# Patient Record
Sex: MALE | Race: WHITE | NOT HISPANIC OR LATINO | Employment: FULL TIME | URBAN - METROPOLITAN AREA
[De-identification: names, ages, dates, MRNs, and addresses within clinical notes are randomized per-mention and may not be internally consistent; named-entity substitution may affect disease eponyms.]

---

## 2021-01-01 LAB — RETINOPATHY: NORMAL

## 2021-08-19 ENCOUNTER — TRANSFERRED RECORDS (OUTPATIENT)
Dept: HEALTH INFORMATION MANAGEMENT | Facility: CLINIC | Age: 36
End: 2021-08-19

## 2021-08-19 LAB
ALT SERPL-CCNC: 28 U/L (ref 9–46)
CREATININE (EXTERNAL): 1.06 MG/DL (ref 0.6–1.35)
GFR ESTIMATED (EXTERNAL): 90 ML/MIN/1.73M2
GFR ESTIMATED (IF AFRICAN AMERICAN) (EXTERNAL): 105 ML/MIN/1.73M2
GLUCOSE (EXTERNAL): 176 MG/DL (ref 65–99)
HBA1C MFR BLD: 11 %
POTASSIUM (EXTERNAL): 4.1 MMOL/L (ref 3.5–5.3)

## 2021-08-23 ENCOUNTER — MEDICAL CORRESPONDENCE (OUTPATIENT)
Dept: HEALTH INFORMATION MANAGEMENT | Facility: CLINIC | Age: 36
End: 2021-08-23

## 2022-01-21 ENCOUNTER — TRANSFERRED RECORDS (OUTPATIENT)
Dept: HEALTH INFORMATION MANAGEMENT | Facility: CLINIC | Age: 37
End: 2022-01-21

## 2022-01-21 ENCOUNTER — MEDICAL CORRESPONDENCE (OUTPATIENT)
Dept: HEALTH INFORMATION MANAGEMENT | Facility: CLINIC | Age: 37
End: 2022-01-21

## 2022-02-04 ENCOUNTER — TELEPHONE (OUTPATIENT)
Dept: ENDOCRINOLOGY | Facility: CLINIC | Age: 37
End: 2022-02-04

## 2022-03-06 ENCOUNTER — HEALTH MAINTENANCE LETTER (OUTPATIENT)
Age: 37
End: 2022-03-06

## 2022-05-11 ENCOUNTER — TELEPHONE (OUTPATIENT)
Dept: ENDOCRINOLOGY | Facility: CLINIC | Age: 37
End: 2022-05-11

## 2022-05-11 ENCOUNTER — OFFICE VISIT (OUTPATIENT)
Dept: ENDOCRINOLOGY | Facility: CLINIC | Age: 37
End: 2022-05-11
Payer: COMMERCIAL

## 2022-05-11 VITALS — HEART RATE: 91 BPM | WEIGHT: 176.2 LBS | SYSTOLIC BLOOD PRESSURE: 138 MMHG | DIASTOLIC BLOOD PRESSURE: 95 MMHG

## 2022-05-11 DIAGNOSIS — E78.1 HYPERTRIGLYCERIDEMIA: ICD-10-CM

## 2022-05-11 DIAGNOSIS — E11.65 UNCONTROLLED TYPE 2 DIABETES MELLITUS WITH HYPERGLYCEMIA (H): Primary | ICD-10-CM

## 2022-05-11 PROCEDURE — 99205 OFFICE O/P NEW HI 60 MIN: CPT | Performed by: INTERNAL MEDICINE

## 2022-05-11 RX ORDER — DULAGLUTIDE 0.75 MG/.5ML
0.75 INJECTION, SOLUTION SUBCUTANEOUS
Qty: 2 ML | Refills: 3 | Status: SHIPPED | OUTPATIENT
Start: 2022-05-11 | End: 2022-06-27 | Stop reason: DRUGHIGH

## 2022-05-11 RX ORDER — PROCHLORPERAZINE 25 MG/1
1 SUPPOSITORY RECTAL CONTINUOUS PRN
Qty: 1 EACH | Refills: 3 | Status: SHIPPED | OUTPATIENT
Start: 2022-05-11 | End: 2022-08-09

## 2022-05-11 RX ORDER — FENOFIBRIC ACID 135 MG/1
CAPSULE, DELAYED RELEASE ORAL
COMMUNITY
End: 2022-08-11

## 2022-05-11 RX ORDER — PROCHLORPERAZINE 25 MG/1
1 SUPPOSITORY RECTAL CONTINUOUS PRN
Qty: 3 EACH | Refills: 5 | Status: SHIPPED | OUTPATIENT
Start: 2022-05-11 | End: 2022-08-09

## 2022-05-11 RX ORDER — ESCITALOPRAM OXALATE 10 MG/1
TABLET ORAL
COMMUNITY

## 2022-05-11 RX ORDER — DAPAGLIFLOZIN 10 MG/1
TABLET, FILM COATED ORAL
COMMUNITY
End: 2023-10-17

## 2022-05-11 RX ORDER — CHLORAL HYDRATE 500 MG
CAPSULE ORAL
COMMUNITY

## 2022-05-11 NOTE — PROGRESS NOTES
Ed Murcia is a 37 year old yo male here to establish care of Diabetes Mellitus.  Has PCP at [facility] (not available in CareEverywhere). He also has PMHx of ADHD and hypertriglyceridemia    1) Diabetes Mellitus-- Type 2    Diabetes History:  Diagnosis: 2013, noticed polyuria/polydypsia, had screen done and diagnosed with DM2, some weight loss minimal at time of diagnosis  Hospitalizations: none  Previous Regimens:   Current Regimen: Metformin 1000mg BID, Farxiga 10mg daily    Overall trying to get numbers in better place, watch diet, saw DM educator in Sept 2021 and got new meter.     BG check- Was checking SMBG, new Contour Next One, checking 1-3x daily,   Trends-  4 months ago (no test strips recently)  Am- 181  PM- 140s    Works as  at a school, walks dog daily    Complications: none known  Fam: aunt Paternal w/ DM    Last eye exam: 1/1/2021- not completed adequately, but reportedly no NPDR (3D scan completed)  Foot Exam: completed today  ACEI/ARB: not now, no recent ZACHARY  Statin/ASA: not currently taking, on fish oil    24hr Recall: ordering hello fresh, trying to avoid fast food, drinks lots of water  Breakfast- eggs & broccoli, coffee w/ small amount of sweetener, dog walk  Lunch- culvers- chicken sandwich, few fries, regular soda  Went to work  Dinner- Turkey sandwich with cheese, chips, water, blueberries  Snacks- noodles  Beverages- water, root beer, coffee     BP Readings from Last 3 Encounters:   05/11/22 (!) 138/95       1/20/2022-  A1C- 9.5%  CBC- wnl  CMP- wnl (glucose 227)  Lipids- , HDL 38, , LDL (too high)  UA- No urine protein      No results found for: A1C      No results for input(s): CHOL, HDL, LDL, TRIG, CHOLHDLRATIO in the last 28177 hours.    No results found for: MICROL  No results found for: MICROALBUMIN      Wt Readings from Last 3 Encounters:   05/11/22 79.9 kg (176 lb 3.2 oz)       Current Outpatient Medications   Medication Sig Dispense Refill     blood  glucose (NO BRAND SPECIFIED) lancets standard Use to test blood sugar 2 times daily or as directed. 100 each 3     blood glucose (NO BRAND SPECIFIED) test strip Use to test blood sugar 2 times daily or as directed. 100 strip 3     Choline Fenofibrate (FENOFIBRIC ACID) 135 MG CPDR        Continuous Blood Gluc Sensor (DEXCOM G6 SENSOR) MISC 1 Device continuous prn (Use for continuous glucose testing, change every 10-days) Dispense 3 box (of 3 sensors) per 3 months 3 each 5     Continuous Blood Gluc Transmit (DEXCOM G6 TRANSMITTER) MISC 1 Device continuous prn (use for continuous glucose testing, change every 90 days) 1 each 3     dapagliflozin (FARXIGA) 10 MG TABS tablet        dulaglutide (TRULICITY) 0.75 MG/0.5ML pen Inject 0.75 mg Subcutaneous every 7 days 2 mL 3     escitalopram (LEXAPRO) 10 MG tablet        fish oil-omega-3 fatty acids 1000 MG capsule        metFORMIN (GLUCOPHAGE) 1000 MG tablet        ADDERALL XR 10 MG OR CP24 1 capsuled daily - needs office visit 30 0     ADDERALL XR 10 MG OR CP24 1 Tablet Daily 30 0       Histories reviewed and updated in Epic.  Past Medical History:   Diagnosis Date     Attention deficit disorder without mention of hyperactivity        No past surgical history on file.    Allergies:  Seasonale    Social History     Tobacco Use     Smoking status: Current Some Day Smoker     Types: Cigarettes, Cigars     Smokeless tobacco: Never Used   Substance Use Topics     Alcohol use: Yes       No family history on file.       REVIEW OF SYSTEMS:   ROS: 10 point ROS neg other than the symptoms noted above in the HPI.      EXAM:  Vitals: BP (!) 138/95   Pulse 91   Wt 79.9 kg (176 lb 3.2 oz)     BMIE= There is no height or weight on file to calculate BMI.  Exam:  Constitutional: healthy, alert, no acute distress  Head: Normocephalic. No masses, lesions, no exophthalmos/proptosis  ENT: normal thyroid, no palpable nodules, no cervical lymph nodes  Respiratory: nonlabored  Gastrointestinal:  Abdomen soft, non-tender.  Musculoskeletal: extremities normal- no gross deformities noted, gait normal and normal muscle tone  Skin: no suspicious lesions or rashes  Neurologic: Gait normal. sensation grossly intact  Psychiatric: mentation appears normal, calm  Foot Exam: normal DP and PT pulses, no trophic changes or ulcerative lesions and normal sensory exam    ASSESSMENT/PLAN:  No diagnosis found.  Orders Placed This Encounter   Procedures     Hemoglobin A1c     Albumin Random Urine Quantitative with Creat Ratio     Lipid panel reflex to direct LDL Fasting     AMB Adult Diabetes Educator Referral       1) Diabetes Mellitus -- NOT at goal, last a1c 9.5% 1/2022  - Glucose Control-    - Will plan for intensification of medication regimen with addition of Ozempic 0.25mg weekly x4 weeks with increse to 0.5mg weekly.    - Continue metformin, Farxia, and dietary changes   - Start dexcom today, RX sent to [facility]   - Will meet with DM educator for continued review of lifestyle changes, as he wants to focus here. Goal is to avoid insulin  - UTD on maintenance, needs ZACHARY, and updated A1C    2) Hypertriglyceridemia   - On Fenofibrate, Fish Oil   -Recheck, these started in Jan   - May need intensification of regimen    RTC- 2 months      A total of 66 minutes were spent today 05/11/22 on this visit including chart review, history and counseling, documentation and other activities as detailed above.       Answers for HPI/ROS submitted by the patient on 5/2/2022  General Symptoms: No  Skin Symptoms: No  HENT Symptoms: No  EYE SYMPTOMS: No  HEART SYMPTOMS: No  LUNG SYMPTOMS: No  INTESTINAL SYMPTOMS: No  URINARY SYMPTOMS: No  REPRODUCTIVE SYMPTOMS: No  SKELETAL SYMPTOMS: No  BLOOD SYMPTOMS: No  NERVOUS SYSTEM SYMPTOMS: No  MENTAL HEALTH SYMPTOMS: No

## 2022-05-11 NOTE — PATIENT INSTRUCTIONS
21) Start trulicity 0.75mg weekly  2) Start Dexcom  3) Recheck A1C and lipids, check urine microalbumin (protein)    Glucose goals according to the American Diabetes Association:  --Pre-meal (including fasting, before meals):  mg/dl  --2 hours after eating: <180 mg/dl, ideally 100-150 mg/dl      Dexcom Clarity

## 2022-05-11 NOTE — TELEPHONE ENCOUNTER
PA Initiation    Medication: Contour Next - PA Pending  Insurance Company: [employer] [phone] Fax [phone]  Pharmacy Filling the Rx:    Filling Pharmacy Phone:    Filling Pharmacy Fax:    Start Date: 5/11/2022

## 2022-05-11 NOTE — LETTER
5/11/2022         RE: Ed Murcia  [address] [place] [address]        Dear Colleague,    Thank you for referring your patient, Ed Murcia, to the [facility] [place]. Please see a copy of my visit note below.    Ed Murcia is a 37 year old yo male here to establish care of Diabetes Mellitus.  Has PCP at [facility] (not available in CareEverywhere). He also has PMHx of ADHD and hypertriglyceridemia    1) Diabetes Mellitus-- Type 2    Diabetes History:  Diagnosis: 2013, noticed polyuria/polydypsia, had screen done and diagnosed with DM2, some weight loss minimal at time of diagnosis  Hospitalizations: none  Previous Regimens:   Current Regimen: Metformin 1000mg BID, Farxiga 10mg daily    Overall trying to get numbers in better place, watch diet, saw DM educator in Sept 2021 and got new meter.     BG check- Was checking SMBG, new Contour Next One, checking 1-3x daily,   Trends-  4 months ago (no test strips recently)  Am- 181  PM- 140s    Works as  at a school, walks dog daily    Complications: none known  Fam: aunt Paternal w/ DM    Last eye exam: 1/1/2021- not completed adequately, but reportedly no NPDR (3D scan completed)  Foot Exam: completed today  ACEI/ARB: not now, no recent ZACHARY  Statin/ASA: not currently taking, on fish oil    24hr Recall: ordering hello fresh, trying to avoid fast food, drinks lots of water  Breakfast- eggs & broccoli, coffee w/ small amount of sweetener, dog walk  Lunch- culvers- chicken sandwich, few fries, regular soda  Went to work  Dinner- Turkey sandwich with cheese, chips, water, blueberries  Snacks- noodles  Beverages- water, root beer, coffee     BP Readings from Last 3 Encounters:   05/11/22 (!) 138/95       1/20/2022-  A1C- 9.5%  CBC- wnl  CMP- wnl (glucose 227)  Lipids- , HDL 38, , LDL (too high)  UA- No urine protein      No results found for: A1C      No results for input(s): CHOL, HDL, LDL, TRIG,  CHOLHDLRATIO in the last 18232 hours.    No results found for: MICROL  No results found for: MICROALBUMIN      Wt Readings from Last 3 Encounters:   05/11/22 79.9 kg (176 lb 3.2 oz)       Current Outpatient Medications   Medication Sig Dispense Refill     blood glucose (NO BRAND SPECIFIED) lancets standard Use to test blood sugar 2 times daily or as directed. 100 each 3     blood glucose (NO BRAND SPECIFIED) test strip Use to test blood sugar 2 times daily or as directed. 100 strip 3     Choline Fenofibrate (FENOFIBRIC ACID) 135 MG CPDR        Continuous Blood Gluc Sensor (DEXCOM G6 SENSOR) MISC 1 Device continuous prn (Use for continuous glucose testing, change every 10-days) Dispense 3 box (of 3 sensors) per 3 months 3 each 5     Continuous Blood Gluc Transmit (DEXCOM G6 TRANSMITTER) MISC 1 Device continuous prn (use for continuous glucose testing, change every 90 days) 1 each 3     dapagliflozin (FARXIGA) 10 MG TABS tablet        dulaglutide (TRULICITY) 0.75 MG/0.5ML pen Inject 0.75 mg Subcutaneous every 7 days 2 mL 3     escitalopram (LEXAPRO) 10 MG tablet        fish oil-omega-3 fatty acids 1000 MG capsule        metFORMIN (GLUCOPHAGE) 1000 MG tablet        ADDERALL XR 10 MG OR CP24 1 capsuled daily - needs office visit 30 0     ADDERALL XR 10 MG OR CP24 1 Tablet Daily 30 0       Histories reviewed and updated in Epic.  Past Medical History:   Diagnosis Date     Attention deficit disorder without mention of hyperactivity        No past surgical history on file.    Allergies:  Seasonale    Social History     Tobacco Use     Smoking status: Current Some Day Smoker     Types: Cigarettes, Cigars     Smokeless tobacco: Never Used   Substance Use Topics     Alcohol use: Yes       No family history on file.       REVIEW OF SYSTEMS:   ROS: 10 point ROS neg other than the symptoms noted above in the HPI.      EXAM:  Vitals: BP (!) 138/95   Pulse 91   Wt 79.9 kg (176 lb 3.2 oz)     BMIE= There is no height or weight on  file to calculate BMI.  Exam:  Constitutional: healthy, alert, no acute distress  Head: Normocephalic. No masses, lesions, no exophthalmos/proptosis  ENT: normal thyroid, no palpable nodules, no cervical lymph nodes  Respiratory: nonlabored  Gastrointestinal: Abdomen soft, non-tender.  Musculoskeletal: extremities normal- no gross deformities noted, gait normal and normal muscle tone  Skin: no suspicious lesions or rashes  Neurologic: Gait normal. sensation grossly intact  Psychiatric: mentation appears normal, calm  Foot Exam: normal DP and PT pulses, no trophic changes or ulcerative lesions and normal sensory exam    ASSESSMENT/PLAN:  No diagnosis found.  Orders Placed This Encounter   Procedures     Hemoglobin A1c     Albumin Random Urine Quantitative with Creat Ratio     Lipid panel reflex to direct LDL Fasting     AMB Adult Diabetes Educator Referral       1) Diabetes Mellitus -- NOT at goal, last a1c 9.5% 1/2022  - Glucose Control-    - Will plan for intensification of medication regimen with addition of Ozempic 0.25mg weekly x4 weeks with increse to 0.5mg weekly.    - Continue metformin, Farxia, and dietary changes   - Start dexcom today, RX sent to [facility] [facility]   - Will meet with DM educator for continued review of lifestyle changes, as he wants to focus here. Goal is to avoid insulin  - UTD on maintenance, needs ZACHARY, and updated A1C    2) Hypertriglyceridemia   - On Fenofibrate, Fish Oil   -Recheck, these started in Jan   - May need intensification of regimen    RTC- 2 months      A total of 66 minutes were spent today 05/11/22 on this visit including chart review, history and counseling, documentation and other activities as detailed above.       Answers for HPI/ROS submitted by the patient on 5/2/2022  General Symptoms: No  Skin Symptoms: No  HENT Symptoms: No  EYE SYMPTOMS: No  HEART SYMPTOMS: No  LUNG SYMPTOMS: No  INTESTINAL SYMPTOMS: No  URINARY SYMPTOMS: No  REPRODUCTIVE SYMPTOMS:  No  SKELETAL SYMPTOMS: No  BLOOD SYMPTOMS: No  NERVOUS SYSTEM SYMPTOMS: No  MENTAL HEALTH SYMPTOMS: No          Again, thank you for allowing me to participate in the care of your patient.        Sincerely,        Anjali Forte MD

## 2022-05-11 NOTE — TELEPHONE ENCOUNTER
Contour Next Strips not covered. Preferred is Accu Chek. Would you like to change to preferred or PA?  If you would like to change to preferred please send new RX for Meter, Strips and Lancets

## 2022-05-12 ENCOUNTER — TELEPHONE (OUTPATIENT)
Dept: ENDOCRINOLOGY | Facility: CLINIC | Age: 37
End: 2022-05-12

## 2022-05-13 NOTE — TELEPHONE ENCOUNTER
PRIOR AUTHORIZATION DENIED    Medication: Contour Next - PA Denied    Denial Date: 5/13/2022    Denial Rational:     Appeal Information:

## 2022-05-13 NOTE — TELEPHONE ENCOUNTER
PA for Contour Next denied as pt is not using an insulin pump.  Would you like to change to preferred or Appeal?    Preferred One touch or Accu Chek

## 2022-05-17 ENCOUNTER — TELEPHONE (OUTPATIENT)
Dept: ENDOCRINOLOGY | Facility: CLINIC | Age: 37
End: 2022-05-17
Payer: COMMERCIAL

## 2022-05-17 DIAGNOSIS — E11.65 UNCONTROLLED TYPE 2 DIABETES MELLITUS WITH HYPERGLYCEMIA (H): ICD-10-CM

## 2022-05-17 NOTE — TELEPHONE ENCOUNTER
PLEASE TRY TO GET PA APPROVED FOR US TO BILL THIS FOR A 90 DAY SUPPLY    Please route determinations to the Pharm Diabetes pool ([id number]).      Thank you!    Diabetes Care Services Team   [facility]  [address] [place] [address]

## 2022-05-23 NOTE — TELEPHONE ENCOUNTER
PA Initiation    Medication: Dexcom G6 Transmitter (ONLY!)  Insurance Company:    Pharmacy Filling the Rx: [facility] - [place], [place] - [address]  Filling Pharmacy Phone: [phone]  Filling Pharmacy Fax:    Start Date: 5/23/2022

## 2022-05-24 DIAGNOSIS — E11.65 UNCONTROLLED TYPE 2 DIABETES MELLITUS WITH HYPERGLYCEMIA (H): Primary | ICD-10-CM

## 2022-05-24 NOTE — TELEPHONE ENCOUNTER
Prior Authorization Not Needed per Insurance    Medication: Dexcom G6 Transmitter (ONLY!)  Insurance Company:    Expected CoPay:      Pharmacy Filling the Rx: [facility] - [place], [place] - [address]  Pharmacy Notified: Yes  Patient Notified: Yes- PHARMACY WILL NOTIFY WHEN READY

## 2022-05-24 NOTE — TELEPHONE ENCOUNTER
Patient is requesting an rx for:    Onetouch Ultra 2 w/ Device Kit    Please verify and send new rx. Thank you    [facility]  [phone]

## 2022-05-26 RX ORDER — BLOOD-GLUCOSE METER
EACH MISCELLANEOUS
Qty: 1 KIT | Refills: 0 | Status: SHIPPED | OUTPATIENT
Start: 2022-05-26 | End: 2022-11-01

## 2022-06-14 ENCOUNTER — ALLIED HEALTH/NURSE VISIT (OUTPATIENT)
Dept: EDUCATION SERVICES | Facility: CLINIC | Age: 37
End: 2022-06-14
Attending: INTERNAL MEDICINE
Payer: COMMERCIAL

## 2022-06-14 DIAGNOSIS — E11.65 UNCONTROLLED TYPE 2 DIABETES MELLITUS WITH HYPERGLYCEMIA (H): Primary | ICD-10-CM

## 2022-06-14 PROCEDURE — G0108 DIAB MANAGE TRN  PER INDIV: HCPCS | Performed by: DIETITIAN, REGISTERED

## 2022-06-14 RX ORDER — METFORMIN HCL 500 MG
2000 TABLET, EXTENDED RELEASE 24 HR ORAL
Qty: 120 TABLET | Refills: 5 | Status: SHIPPED | OUTPATIENT
Start: 2022-06-14 | End: 2022-07-12

## 2022-06-14 NOTE — PROGRESS NOTES
Diabetes Self-Management Education & Support    Presents for: Individual review    Type of Visit: In Person    How would patient like to obtain AVS? In person    ASSESSMENT:  Shen has a ~ 10 year history of T2D with elevated A1C for some time.   Reports some initial teaching at dx, but wasn't too comprehensive or helpful, he is not sure of how all the pieces of diabetes care go together.  Recently saw endo and sounds like he learned quite a bit there.     Shen just started Trulicity, on one hand would like to be able to decrease meds, on the other wants to take care of DB.  Praised he is doing lots of positive behaviors: checking BG with Dexcom, taking meds as prescribed, aware of trying to eat a balanced meal plan, being active at work and home with 7 year-old daughter.  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    INTERVENTIONS:    Education provided today on:  AADE Self-Care Behaviors:  Diabetes Pathophysiology  Healthy Eating: carbohydrate counting, portion control, plate planning method, label reading- he learned carb counting long ago, doesn't apply it, doesn't really feel he understands - encouraged balanced eating with moderate portions    Being Active: relationship to blood glucose    Monitoring: log and interpret results, individual blood glucose targets and rec'd target of >75% time in target    Taking Medication: action of prescribed  medication, side effects of prescribed medications, when to take medications and need to increase Metformin to most-effective dose, 2000 mg/day (GFR WNL 6 mos ago)    Problem Solving: high blood glucose - causes, signs/symptoms, treatment and prevention and current meds don't cause hypoglycemia    Reducing Risks: major complications of diabetes, appropriate dental care, annual eye exam and A1C - goals, relating to blood glucose levels, how often to check    Healthy Coping: recognize feelings about diagnosis, benefits of making appropriate lifestyle changes and utilize support systems    Opportunities for ongoing education and support in diabetes-self management were discussed. Pt verbalized understanding of concepts discussed and recommendations provided today.       Education Materials Provided:  Carbohydrate Counting and My Plate Planner      PLAN  1) increase Metformin to 2000 mg/day (start by increasing to 3 tabs today, then in a few says if tolerated, 4 tabs goal), change to XR  2) I will check Dexcom data in ~ 2 weeks to see if Met increase is visibly helping and make sure Rx is in to increase Trulicity  3) aim for 30-60 g carb/meal    Topics to cover at upcoming visits: Monitoring, Taking Medication and Reducing Risks  Follow-up: mychart Dexcom review in ~2 weeks, then in person 8/2    See Goals Section for co-developed, patient-stated behavior change goals.  AVS available to patient today.      SUBJECTIVE / OBJECTIVE:  Presents for: Individual review  Accompanied by: Self  Diabetes education in the past 24mo: No  Focus of Visit: Other (trying to find a balance)  Diabetes type: Type 2  Date of diagnosis: ~ 10 years  Disease course: Getting harder to manage  How confident are you filling out medical forms by yourself:: Somewhat  Diabetes management related comments/concerns: really never had DB ed, just a nutrition session years ago, would like to find the balance of how to take care  Other concerns::  None  Cultural Influences/Ethnic Background:  Not  or       Diabetes Symptoms & Complications:  Fatigue: Sometimes  Neuropathy: Yes (reports some in hands specifically- cold makes them hurt, can't feel hot, says all life)  Polydipsia: Sometimes  Polyphagia: No  Polyuria: Sometimes  Visual change: No  Slow healing wounds: No  Autonomic neuropathy: No  CVA: No  Heart disease: No  Nephropathy: No  Peripheral neuropathy: No  Peripheral Vascular Disease: No  Retinopathy: No  Sexual dysfunction: No    Patient Problem List and Family Medical History reviewed for relevant medical history, current medical status, and diabetes risk factors.    Vitals:  There were no vitals taken for this visit.  There is no height or weight on file to calculate BMI.   Last 3 BP:   BP Readings from Last 3 Encounters:   05/11/22 (!) 138/95       History   Smoking Status     Current Some Day Smoker     Types: Cigarettes, Cigars   Smokeless Tobacco     Never Used       Labs:  No results found for: A1C  No results found for: GLC  No results found for: LDL  No results found for: HDL]  No results found for: GFRESTIMATED  No results found for: GFRESTBLACK  No results found for: CR  No results found for: MICROALBUMIN    Healthy Eating:  Healthy Eating Assessed Today: Yes  Cultural/[place] diet restrictions?: No (some excema with milk, does ok with goat milk)  Meal planning/habits: High carb, Heart healthy (feels like he needs more carb to keep energy up, aiming for veg, potato, and meat at meals)  How many times a week on average do you eat food made away from home (restaurant/take-out)?: 1  Meals include: Breakfast, Lunch, Dinner (trying to eat healthier, reduced fast food, lower appetite with Trulicity)  Breakfast: eggs with veggies in it- variety and toast  Lunch: at work- tries to bring, not always hungry- maybe nuts and apple  Dinner: pork chop with carrot and brussels potato  Snacks: fruit all variety, peanuts  Beverages: Water,  Coffee, Alcohol (if uses pop it's SF)  Has patient met with a dietitian in the past?: Yes (years ago)    Being Active:  Being Active Assessed Today: Yes  Exercise::  (works as  year round, on feet 8 hours/day, walks dog, bikes)  Days per week of moderate to strenuous exercise (like a brisk walk): (P) 7  On average, minutes per day of exercise at this level: (P) 40  How intense was your typical exercise? : (P) Moderate (like brisk walking)  Exercise Minutes per Week: (P) 280  Barrier to exercise: None    Monitoring:  Monitoring Assessed Today: Yes (reports seeing 150-160 range, occasionally sees 120's to 400+, this AM at appt 199 which is down from waking number)  Did patient bring glucose meter to appointment? : Yes  Blood Glucose Meter: One Touch, CGM (dexcom, meter for back up)  Times checking blood sugar at home (number): Other  Times checking blood sugar at home (per): Day  Blood glucose trend: Fluctuating    Glucose data:              Taking Medications:  Diabetes Medication(s)     Biguanides       metFORMIN (GLUCOPHAGE) 1000 MG tablet        Sodium-Glucose Co-Transporter 2 (SGLT2) Inhibitors       dapagliflozin (FARXIGA) 10 MG TABS tablet        Incretin Mimetic Agents (GLP-1 Receptor Agonists)       dulaglutide (TRULICITY) 0.75 MG/0.5ML pen    Inject 0.75 mg Subcutaneous every 7 days          Taking Medication Assessed Today: Yes  Current Treatments: Oral Medication (taken by mouth), Non-insulin Injectables (Metformin, Farxiga, Trulicity)  Problems taking diabetes medications regularly?: No  Diabetes medication side effects?: No    Problem Solving:  Problem Solving Assessed Today: Yes  Is the patient at risk for hypoglycemia?: No  Is the patient at risk for DKA?: No  Does patient have severe weather/disaster plan for diabetes management?: Not Needed  Does patient have sick day plan for diabetes management?: Not Needed       Reducing Risks:  Reducing Risks Assessed Today: Yes  Diabetes Risks: Family  History  CAD Risks: Diabetes Mellitus, Male sex  Has dilated eye exam at least once a year?: Yes (last year)  Sees dentist every 6 months?: No  Feet checked by healthcare provider in the last year?: Yes    Healthy Coping:  Healthy Coping Assessed Today: Yes  Emotional response to diabetes: Acceptance, Ready to learn, Concern for health and well-being  Informal Support system:: Children, Family, Friends, Spouse  Stage of change: ACTION (Actively working towards change)  Patient Activation Measure Survey Score:  No flowsheet data found.      Marah Saucedo RD, LD, [facility]    Time Spent: 60 minutes  Encounter Type: Individual      Any diabetes medication dose changes were made via the Certified Diabetes Care & Education Protocol in collaboration with the patient's referring provider. A copy of this encounter was shared with the provider.

## 2022-06-14 NOTE — LETTER
6/14/2022         RE: Ed Murcia  [address] [place] [address]        Dear Colleague,    Thank you for referring your patient, Ed Murcia, to the [facility]. Please see a copy of my visit note below.    Diabetes Self-Management Education & Support    Presents for: Individual review    Type of Visit: In Person    How would patient like to obtain AVS? In person    ASSESSMENT:  Shen has a ~ 10 year history of T2D with elevated A1C for some time.   Reports some initial teaching at , but wasn't too comprehensive or helpful, he is not sure of how all the pieces of diabetes care go together.  Recently saw endo and sounds like he learned quite a bit there.     Shen just started Trulicity, on one hand would like to be able to decrease meds, on the other wants to take care of DB.  Praised he is doing lots of positive behaviors: checking BG with Dexcom, taking meds as prescribed, aware of trying to eat a balanced meal plan, being active at work and home with 7 year-old daughter.  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    INTERVENTIONS:    Education provided today on:  AADE Self-Care Behaviors:  Diabetes Pathophysiology  Healthy Eating: carbohydrate counting, portion control, plate planning method, label reading- he learned carb counting long ago, doesn't apply it, doesn't really feel he understands -  encouraged balanced eating with moderate portions    Being Active: relationship to blood glucose    Monitoring: log and interpret results, individual blood glucose targets and rec'd target of >75% time in target    Taking Medication: action of prescribed medication, side effects of prescribed medications, when to take medications and need to increase Metformin to most-effective dose, 2000 mg/day (GFR WNL 6 mos ago)    Problem Solving: high blood glucose - causes, signs/symptoms, treatment and prevention and current meds don't cause hypoglycemia    Reducing Risks: major complications of diabetes, appropriate dental care, annual eye exam and A1C - goals, relating to blood glucose levels, how often to check    Healthy Coping: recognize feelings about diagnosis, benefits of making appropriate lifestyle changes and utilize support systems    Opportunities for ongoing education and support in diabetes-self management were discussed. Pt verbalized understanding of concepts discussed and recommendations provided today.       Education Materials Provided:  Carbohydrate Counting and My Plate Planner      PLAN  1) increase Metformin to 2000 mg/day (start by increasing to 3 tabs today, then in a few says if tolerated, 4 tabs goal), change to XR  2) I will check Dexcom data in ~ 2 weeks to see if Met increase is visibly helping and make sure Rx is in to increase Trulicity  3) aim for 30-60 g carb/meal    Topics to cover at upcoming visits: Monitoring, Taking Medication and Reducing Risks  Follow-up: mychart Dexcom review in ~2 weeks, then in person 8/2    See Goals Section for co-developed, patient-stated behavior change goals.  AVS available to patient today.      SUBJECTIVE / OBJECTIVE:  Presents for: Individual review  Accompanied by: Self  Diabetes education in the past 24mo: No  Focus of Visit: Other (trying to find a balance)  Diabetes type: Type 2  Date of diagnosis: ~ 10 years  Disease course: Getting harder to  manage  How confident are you filling out medical forms by yourself:: Somewhat  Diabetes management related comments/concerns: really never had DB ed, just a nutrition session years ago, would like to find the balance of how to take care  Other concerns:: None  Cultural Influences/Ethnic Background:  Not  or       Diabetes Symptoms & Complications:  Fatigue: Sometimes  Neuropathy: Yes (reports some in hands specifically- cold makes them hurt, can't feel hot, says all life)  Polydipsia: Sometimes  Polyphagia: No  Polyuria: Sometimes  Visual change: No  Slow healing wounds: No  Autonomic neuropathy: No  CVA: No  Heart disease: No  Nephropathy: No  Peripheral neuropathy: No  Peripheral Vascular Disease: No  Retinopathy: No  Sexual dysfunction: No    Patient Problem List and Family Medical History reviewed for relevant medical history, current medical status, and diabetes risk factors.    Vitals:  There were no vitals taken for this visit.  There is no height or weight on file to calculate BMI.   Last 3 BP:   BP Readings from Last 3 Encounters:   05/11/22 (!) 138/95       History   Smoking Status     Current Some Day Smoker     Types: Cigarettes, Cigars   Smokeless Tobacco     Never Used       Labs:  No results found for: A1C  No results found for: GLC  No results found for: LDL  No results found for: HDL]  No results found for: GFRESTIMATED  No results found for: GFRESTBLACK  No results found for: CR  No results found for: MICROALBUMIN    Healthy Eating:  Healthy Eating Assessed Today: Yes  Cultural/Baptism diet restrictions?: No (some excema with milk, does ok with goat milk)  Meal planning/habits: High carb, Heart healthy (feels like he needs more carb to keep energy up, aiming for veg, potato, and meat at meals)  How many times a week on average do you eat food made away from home (restaurant/take-out)?: 1  Meals include: Breakfast, Lunch, Dinner (trying to eat healthier, reduced fast food, lower  appetite with Trulicity)  Breakfast: eggs with veggies in it- variety and toast  Lunch: at work- tries to bring, not always hungry- maybe nuts and apple  Dinner: pork chop with carrot and brussels potato  Snacks: fruit all variety, peanuts  Beverages: Water, Coffee, Alcohol (if uses pop it's SF)  Has patient met with a dietitian in the past?: Yes (years ago)    Being Active:  Being Active Assessed Today: Yes  Exercise::  (works as  year round, on feet 8 hours/day, walks dog, bikes)  Days per week of moderate to strenuous exercise (like a brisk walk): (P) 7  On average, minutes per day of exercise at this level: (P) 40  How intense was your typical exercise? : (P) Moderate (like brisk walking)  Exercise Minutes per Week: (P) 280  Barrier to exercise: None    Monitoring:  Monitoring Assessed Today: Yes (reports seeing 150-160 range, occasionally sees 120's to 400+, this AM at appt 199 which is down from waking number)  Did patient bring glucose meter to appointment? : Yes  Blood Glucose Meter: One Touch, CGM (dexcom, meter for back up)  Times checking blood sugar at home (number): Other  Times checking blood sugar at home (per): Day  Blood glucose trend: Fluctuating    Glucose data:              Taking Medications:  Diabetes Medication(s)     Biguanides       metFORMIN (GLUCOPHAGE) 1000 MG tablet        Sodium-Glucose Co-Transporter 2 (SGLT2) Inhibitors       dapagliflozin (FARXIGA) 10 MG TABS tablet        Incretin Mimetic Agents (GLP-1 Receptor Agonists)       dulaglutide (TRULICITY) 0.75 MG/0.5ML pen    Inject 0.75 mg Subcutaneous every 7 days          Taking Medication Assessed Today: Yes  Current Treatments: Oral Medication (taken by mouth), Non-insulin Injectables (Metformin, Farxiga, Trulicity)  Problems taking diabetes medications regularly?: No  Diabetes medication side effects?: No    Problem Solving:  Problem Solving Assessed Today: Yes  Is the patient at risk for hypoglycemia?: No  Is the patient  at risk for DKA?: No  Does patient have severe weather/disaster plan for diabetes management?: Not Needed  Does patient have sick day plan for diabetes management?: Not Needed       Reducing Risks:  Reducing Risks Assessed Today: Yes  Diabetes Risks: Family History  CAD Risks: Diabetes Mellitus, Male sex  Has dilated eye exam at least once a year?: Yes (last year)  Sees dentist every 6 months?: No  Feet checked by healthcare provider in the last year?: Yes    Healthy Coping:  Healthy Coping Assessed Today: Yes  Emotional response to diabetes: Acceptance, Ready to learn, Concern for health and well-being  Informal Support system:: Children, Family, Friends, Spouse  Stage of change: ACTION (Actively working towards change)  Patient Activation Measure Survey Score:  No flowsheet data found.      Marah Saucedo RD, LD, SHERRY    Time Spent: 60 minutes  Encounter Type: Individual      Any diabetes medication dose changes were made via the Certified Diabetes Care & Education Protocol in collaboration with the patient's referring provider. A copy of this encounter was shared with the provider.

## 2022-06-14 NOTE — PATIENT INSTRUCTIONS
Plan:  1) increase to 4 Metformin tabs each day  2) keep under 60 grams carb at each meal for now

## 2022-06-26 ENCOUNTER — HEALTH MAINTENANCE LETTER (OUTPATIENT)
Age: 37
End: 2022-06-26

## 2022-06-27 ENCOUNTER — MYC MEDICAL ADVICE (OUTPATIENT)
Dept: EDUCATION SERVICES | Facility: CLINIC | Age: 37
End: 2022-06-27

## 2022-06-27 ENCOUNTER — PATIENT OUTREACH (OUTPATIENT)
Dept: EDUCATION SERVICES | Facility: CLINIC | Age: 37
End: 2022-06-27

## 2022-06-27 DIAGNOSIS — E11.65 UNCONTROLLED TYPE 2 DIABETES MELLITUS WITH HYPERGLYCEMIA (H): Primary | ICD-10-CM

## 2022-06-27 RX ORDER — DULAGLUTIDE 1.5 MG/.5ML
1.5 INJECTION, SOLUTION SUBCUTANEOUS
Qty: 2 ML | Refills: 1 | Status: SHIPPED | OUTPATIENT
Start: 2022-06-27 | End: 2022-07-12

## 2022-06-27 NOTE — PROGRESS NOTES
Shen was seen  for diabetes care and education. He is also followed by endo.  On  Metformin was changed to XR and increased to 2000 mg/day. He had just started Trulicity 0.75 mg dose/week.    Today's Dexcom report shows ongoing hyperglycemia: only 23% time in target and average 244              Plan:  1) confirm Shen is taking 2000 mg Met XR  2) increase Trulicity to 1.5 mg dose if tolerating    Message to Shen per our plan of :  Yeni Franklin here at [facility].  I saw you on  and had a note to check your Dexcom today ;)  Average has improved, but looks like we've got a bit more work to do to get your body the help it wants!  That's ok, that's why I checked.   Just want to confirm you've increased the Metformin to 4 tabs (2000 mg) each day and that's going ok? I did change the prescription to extended release which is nicer to the stomach.     You've been taking 0.75 mg (start dose) of Trulicity and after 4 weeks, it should be increased to 1.5 mg once/week so I'll get that increase to pharmacy today.   Same thin pack of pens, works the same, once a week, they just have stronger Trulicity medicine in them.   Please pick those up and start the higher dose to continue to get numbers down :)    I see you'll connect with Dr. Forte on  by video, then we are set to connect on  in person.    Let me know if anything above isn't right or if you have any questions :)   Otherwise, Dr. URIOSTEGUI might increase the Trulicity more if needed or we can decide in August.  Cheers! Marah Saucedo RD, LENNOX, SHERRY    F/U with Endocrinologist on , SHERRY on     Marah Saucedo RD, LENNOX, SHERRY

## 2022-06-30 NOTE — TELEPHONE ENCOUNTER
"Shen messages:  Yes I've been taking the 4 pills of metformin at dinner like the bottle says and still take other medications and fish oil too. I still haven't received my new glucose meter yet so I'm not sure what's going with that? I was wondering what should I do with the old medication of metformin I have should I dispose of it?     Reply:  Joesph Gotti,   Awesome! Thanks for letting me know :)  Once you're on that bigger dose of Trulicity, I bet you'll see numbers continue to improve.    I called the [facility] mail order [facility] where Dr. Forte sent the prescription for the other meter and supplies- sounds like you can call any time and let them know you are ready for them to mail it out! They usually want to confirm address when you call.   #[phone]    I think that meter is for \"backup\" or double checking if needed since you have the Dexcom, but on the unlikely chance that it would malfunction, it's nice to have the meter too.     Sorry to say I'm not sure about med disposal- one idea is to take 1 of the \"old metformin\" pills each day and use 3 of the new ones to = 4 tabs/day if you don't want to waste them (and might be ok on your stomach if only using 1 of the old ones each day).  Otherwise, I'm not sure where to tell you for med disposal. Our pharmacy here at [facility] in [place] has a med disposal bin people can use, maybe that's true of your pharmacy too? Best bet might be to call the closest [facility] pharmacy or else [facility] or something and ask their advise.   Romeo, Yeni Saucedo RD, LD, CDCES      "

## 2022-07-12 ENCOUNTER — VIRTUAL VISIT (OUTPATIENT)
Dept: ENDOCRINOLOGY | Facility: CLINIC | Age: 37
End: 2022-07-12
Payer: COMMERCIAL

## 2022-07-12 DIAGNOSIS — E11.65 UNCONTROLLED TYPE 2 DIABETES MELLITUS WITH HYPERGLYCEMIA (H): Primary | ICD-10-CM

## 2022-07-12 PROCEDURE — 99214 OFFICE O/P EST MOD 30 MIN: CPT | Mod: GT | Performed by: INTERNAL MEDICINE

## 2022-07-12 RX ORDER — DULAGLUTIDE 1.5 MG/.5ML
1.5 INJECTION, SOLUTION SUBCUTANEOUS
Qty: 2 ML | Refills: 3 | Status: SHIPPED | OUTPATIENT
Start: 2022-07-12 | End: 2022-11-01

## 2022-07-12 RX ORDER — METFORMIN HCL 500 MG
2000 TABLET, EXTENDED RELEASE 24 HR ORAL
Qty: 360 TABLET | Refills: 3 | Status: SHIPPED | OUTPATIENT
Start: 2022-07-12 | End: 2023-08-09

## 2022-07-12 NOTE — LETTER
"    7/12/2022         RE: Ed Murcia  [address] [place] [address]        Dear Colleague,    Thank you for referring your patient, Ed Murcia, to the [facility] [place]. Please see a copy of my visit note below.      Video-Visit Details    Type of service:  Video Visit  Video Start Time: 10:40  Video End Time:11:07  Originating Location (pt. Location): Home, [place]  Distant Location (provider location):  Home  Platform used for Video Visit: Ban Forte MD    Ed Murcia is a 37 year old year old male here for evaluation of diabetes mellitus via a billable video visit.        Ed Murcia is a 37 year old yo male here to for followup care of Diabetes Mellitus.  [facility] PCP at [facility] (not available in [facility]). He also has PMHx of ADHD and hypertriglyceridemia    INTERVAL HISTORY:  - Dexcom working well, great to know where he's going throughout the day  - Off trulicity for approx 1 week, now back on at higher dose.       1) Diabetes Mellitus-- Type 2    Diabetes History:  Diagnosis: 2013, noticed polyuria/polydypsia, had screen done and diagnosed with DM2, some weight loss minimal at time of diagnosis  Hospitalizations: none  Previous Regimens:   Current Regimen: Metformin 2000mg XR at night, Farxiga 10mg daily, Trulicity 1.5mg (first dose today)    BG check- Dexcom (not yet linked with clinic)  Trends-    AM- 166 today,   Throughout day 170s,   Yesterday-- had some 300s, but overall was 43%  Last night at bedtime 160  Noting spikes at times with heavier carb meals with rise to 300  Eating larger breakfast and smaller lunch and less hungry at evening.   Denies hypos  Improved energy level, less \"yawning/feels better rested\"    Works as  at a school, walks dog daily    Complications: none known  Fam: aunt Paternal w/ DM    Last eye exam: 1/1/2021- not completed adequately, but reportedly no NPDR (3D scan completed)-- scheduled end of " August  Foot Exam: completed today  ACEI/ARB: not now, no recent ZACHARY  Statin/ASA: not currently taking, on fish oil      BP Readings from Last 3 Encounters:   05/11/22 (!) 138/95       1/20/2022-  A1C- 9.5%  CBC- wnl  CMP- wnl (glucose 227)  Lipids- , HDL 38, , LDL (too high)  UA- No urine protein      No results found for: A1C      No results for input(s): CHOL, HDL, LDL, TRIG, CHOLHDLRATIO in the last 36015 hours.    No results found for: MICROL  No results found for: MICROALBUMIN      Wt Readings from Last 3 Encounters:   05/11/22 79.9 kg (176 lb 3.2 oz)       Current Outpatient Medications   Medication Sig Dispense Refill     blood glucose (NO BRAND SPECIFIED) lancets standard Use to test blood sugar 2 times daily or as directed. 100 each 3     blood glucose (NO BRAND SPECIFIED) test strip Use to test blood sugar 2 times daily or as directed. 100 strip 3     blood glucose monitoring (ONE TOUCH ULTRA 2) meter device kit Use to test blood sugar 2 times daily or as directed. 1 kit 0     Choline Fenofibrate (FENOFIBRIC ACID) 135 MG CPDR        Continuous Blood Gluc Sensor (DEXCOM G6 SENSOR) MISC 1 Device continuous prn (Use for continuous glucose testing, change every 10-days) Dispense 3 box (of 3 sensors) per 3 months 3 each 5     Continuous Blood Gluc Transmit (DEXCOM G6 TRANSMITTER) MISC 1 Device continuous prn (use for continuous glucose testing, change every 90 days) 1 each 3     dapagliflozin (FARXIGA) 10 MG TABS tablet        dulaglutide (TRULICITY) 1.5 MG/0.5ML pen Inject 1.5 mg Subcutaneous every 7 days 2 mL 3     escitalopram (LEXAPRO) 10 MG tablet        fish oil-omega-3 fatty acids 1000 MG capsule        metFORMIN (GLUCOPHAGE XR) 500 MG 24 hr tablet Take 4 tablets (2,000 mg) by mouth daily (with dinner) 360 tablet 3       Histories reviewed and updated in Epic.  Past Medical History:   Diagnosis Date     Attention deficit disorder without mention of hyperactivity        No past surgical  history on file.    Allergies:  Seasonale    Social History     Tobacco Use     Smoking status: Current Some Day Smoker     Types: Cigarettes, Cigars     Smokeless tobacco: Never Used   Substance Use Topics     Alcohol use: Yes       No family history on file.       REVIEW OF SYSTEMS:   ROS: 10 point ROS neg other than the symptoms noted above in the HPI.      EXAM:  Vitals: There were no vitals taken for this visit.    BMIE= There is no height or weight on file to calculate BMI.  Exam:  Constitutional: healthy, alert, no acute distress  Head: Normocephalic. No masses, lesions, no exophthalmos/proptosis  ENT: normal thyroid, no palpable nodules, no cervical lymph nodes  Respiratory: nonlabored  Gastrointestinal: Abdomen soft, non-tender.  Musculoskeletal: extremities normal- no gross deformities noted, gait normal and normal muscle tone  Skin: no suspicious lesions or rashes  Neurologic: Gait normal. sensation grossly intact  Psychiatric: mentation appears normal, calm  Foot Exam: normal DP and PT pulses, no trophic changes or ulcerative lesions and normal sensory exam    ASSESSMENT/PLAN:  Orders Placed This Encounter   Procedures     Albumin Random Urine Quantitative with Creat Ratio     Hemoglobin A1c     Glutamic acid decarboxylase antibody     C-peptide     Glucose     Lipid panel reflex to direct LDL Fasting     Basic metabolic panel  (Ca, Cl, CO2, Creat, Gluc, K, Na, BUN)       1) Diabetes Mellitus -- NOT at goal, last a1c 9.5% 1/2022  - Glucose Control-    - Continue Trulicty 1.5mg weekly (just increased today)   - Continue metformin, Farxia, and dietary changes   - Continue Dexcom, link with clinic through clarity   - Will meet with DM educator for continued review of lifestyle changes, as he wants to focus here. Goal is to avoid insulin   - Consider testing for DM1 if still having significant blood glucose excursions/exaggerated response to carbs. Unable to view Dexcom to see pattern today.   - UTD on  maintenance, needs ZACHARY, and updated A1C-- orders placed today    2) Hypertriglyceridemia   - On Fenofibrate, Fish Oil   -Recheck in August when comes in   - May need intensification of regimen    RTC- 3 months    A total of 30 minutes were spent today 07/12/22 on this visit including chart review, history and counseling, documentation and other activities as detailed above.     Answers for HPI/ROS submitted by the patient on 7/6/2022  General Symptoms: No  Skin Symptoms: No  HENT Symptoms: No  EYE SYMPTOMS: No  HEART SYMPTOMS: No  LUNG SYMPTOMS: No  INTESTINAL SYMPTOMS: No  URINARY SYMPTOMS: No  REPRODUCTIVE SYMPTOMS: No  SKELETAL SYMPTOMS: No  BLOOD SYMPTOMS: No  NERVOUS SYSTEM SYMPTOMS: No  MENTAL HEALTH SYMPTOMS: No          Again, thank you for allowing me to participate in the care of your patient.        Sincerely,        Anjali Forte MD

## 2022-07-12 NOTE — PROGRESS NOTES
"  Video-Visit Details    Type of service:  Video Visit  Video Start Time: 10:40  Video End Time:11:07  Originating Location (pt. Location): Home, [place]  Distant Location (provider location):  Home  Platform used for Video Visit: Ban Forte MD    Ed Murcia is a 37 year old year old male here for evaluation of diabetes mellitus via a billable video visit.        Ed Murcia is a 37 year old yo male here to for followup care of Diabetes Mellitus.  Has PCP at [facility] (not available in CareEverywhere). He also has PMHx of ADHD and hypertriglyceridemia    INTERVAL HISTORY:  - Dexcom working well, great to know where he's going throughout the day  - Off trulicity for approx 1 week, now back on at higher dose.       1) Diabetes Mellitus-- Type 2    Diabetes History:  Diagnosis: 2013, noticed polyuria/polydypsia, had screen done and diagnosed with DM2, some weight loss minimal at time of diagnosis  Hospitalizations: none  Previous Regimens:   Current Regimen: Metformin 2000mg XR at night, Farxiga 10mg daily, Trulicity 1.5mg (first dose today)    BG check- Dexcom (not yet linked with clinic)  Trends-    AM- 166 today,   Throughout day 170s,   Yesterday-- had some 300s, but overall was 43%  Last night at bedtime 160  Noting spikes at times with heavier carb meals with rise to 300  Eating larger breakfast and smaller lunch and less hungry at evening.   Denies hypos  Improved energy level, less \"yawning/feels better rested\"    Works as  at a school, walks dog daily    Complications: none known  Fam: aunt Paternal w/ DM    Last eye exam: 1/1/2021- not completed adequately, but reportedly no NPDR (3D scan completed)-- scheduled end of August  Foot Exam: completed today  ACEI/ARB: not now, no recent ZACHARY  Statin/ASA: not currently taking, on fish oil      BP Readings from Last 3 Encounters:   05/11/22 (!) 138/95       1/20/2022-  A1C- 9.5%  CBC- wnl  CMP- wnl (glucose 227)  Lipids- , HDL 38, " , LDL (too high)  UA- No urine protein      No results found for: A1C      No results for input(s): CHOL, HDL, LDL, TRIG, CHOLHDLRATIO in the last 74762 hours.    No results found for: MICROL  No results found for: MICROALBUMIN      Wt Readings from Last 3 Encounters:   05/11/22 79.9 kg (176 lb 3.2 oz)       Current Outpatient Medications   Medication Sig Dispense Refill     blood glucose (NO BRAND SPECIFIED) lancets standard Use to test blood sugar 2 times daily or as directed. 100 each 3     blood glucose (NO BRAND SPECIFIED) test strip Use to test blood sugar 2 times daily or as directed. 100 strip 3     blood glucose monitoring (ONE TOUCH ULTRA 2) meter device kit Use to test blood sugar 2 times daily or as directed. 1 kit 0     Choline Fenofibrate (FENOFIBRIC ACID) 135 MG CPDR        Continuous Blood Gluc Sensor (DEXCOM G6 SENSOR) MISC 1 Device continuous prn (Use for continuous glucose testing, change every 10-days) Dispense 3 box (of 3 sensors) per 3 months 3 each 5     Continuous Blood Gluc Transmit (DEXCOM G6 TRANSMITTER) MISC 1 Device continuous prn (use for continuous glucose testing, change every 90 days) 1 each 3     dapagliflozin (FARXIGA) 10 MG TABS tablet        dulaglutide (TRULICITY) 1.5 MG/0.5ML pen Inject 1.5 mg Subcutaneous every 7 days 2 mL 3     escitalopram (LEXAPRO) 10 MG tablet        fish oil-omega-3 fatty acids 1000 MG capsule        metFORMIN (GLUCOPHAGE XR) 500 MG 24 hr tablet Take 4 tablets (2,000 mg) by mouth daily (with dinner) 360 tablet 3       Histories reviewed and updated in Epic.  Past Medical History:   Diagnosis Date     Attention deficit disorder without mention of hyperactivity        No past surgical history on file.    Allergies:  Seasonale    Social History     Tobacco Use     Smoking status: Current Some Day Smoker     Types: Cigarettes, Cigars     Smokeless tobacco: Never Used   Substance Use Topics     Alcohol use: Yes       No family history on file.        REVIEW OF SYSTEMS:   ROS: 10 point ROS neg other than the symptoms noted above in the HPI.      EXAM:  Vitals: There were no vitals taken for this visit.    BMIE= There is no height or weight on file to calculate BMI.  Exam:  Constitutional: healthy, alert, no acute distress  Head: Normocephalic. No masses, lesions, no exophthalmos/proptosis  ENT: normal thyroid, no palpable nodules, no cervical lymph nodes  Respiratory: nonlabored  Gastrointestinal: Abdomen soft, non-tender.  Musculoskeletal: extremities normal- no gross deformities noted, gait normal and normal muscle tone  Skin: no suspicious lesions or rashes  Neurologic: Gait normal. sensation grossly intact  Psychiatric: mentation appears normal, calm  Foot Exam: normal DP and PT pulses, no trophic changes or ulcerative lesions and normal sensory exam    ASSESSMENT/PLAN:  Orders Placed This Encounter   Procedures     Albumin Random Urine Quantitative with Creat Ratio     Hemoglobin A1c     Glutamic acid decarboxylase antibody     C-peptide     Glucose     Lipid panel reflex to direct LDL Fasting     Basic metabolic panel  (Ca, Cl, CO2, Creat, Gluc, K, Na, BUN)       1) Diabetes Mellitus -- NOT at goal, last a1c 9.5% 1/2022  - Glucose Control-    - Continue Trulicty 1.5mg weekly (just increased today)   - Continue metformin, Farxia, and dietary changes   - Continue Dexcom, link with clinic through clarity   - Will meet with DM educator for continued review of lifestyle changes, as he wants to focus here. Goal is to avoid insulin   - Consider testing for DM1 if still having significant blood glucose excursions/exaggerated response to carbs. Unable to view Dexcom to see pattern today.   - UTD on maintenance, needs ZACHARY, and updated A1C-- orders placed today    2) Hypertriglyceridemia   - On Fenofibrate, Fish Oil   -Recheck in August when comes in   - May need intensification of regimen    RTC- 3 months    A total of 30 minutes were spent today 07/12/22 on this  visit including chart review, history and counseling, documentation and other activities as detailed above.     Answers for HPI/ROS submitted by the patient on 7/6/2022  General Symptoms: No  Skin Symptoms: No  HENT Symptoms: No  EYE SYMPTOMS: No  HEART SYMPTOMS: No  LUNG SYMPTOMS: No  INTESTINAL SYMPTOMS: No  URINARY SYMPTOMS: No  REPRODUCTIVE SYMPTOMS: No  SKELETAL SYMPTOMS: No  BLOOD SYMPTOMS: No  NERVOUS SYSTEM SYMPTOMS: No  MENTAL HEALTH SYMPTOMS: No

## 2022-08-02 ENCOUNTER — ALLIED HEALTH/NURSE VISIT (OUTPATIENT)
Dept: EDUCATION SERVICES | Facility: CLINIC | Age: 37
End: 2022-08-02
Payer: COMMERCIAL

## 2022-08-02 ENCOUNTER — LAB (OUTPATIENT)
Dept: LAB | Facility: CLINIC | Age: 37
End: 2022-08-02
Payer: COMMERCIAL

## 2022-08-02 DIAGNOSIS — E11.65 UNCONTROLLED TYPE 2 DIABETES MELLITUS WITH HYPERGLYCEMIA (H): ICD-10-CM

## 2022-08-02 DIAGNOSIS — E11.65 UNCONTROLLED TYPE 2 DIABETES MELLITUS WITH HYPERGLYCEMIA (H): Primary | ICD-10-CM

## 2022-08-02 LAB
ANION GAP SERPL CALCULATED.3IONS-SCNC: 9 MMOL/L (ref 3–14)
BUN SERPL-MCNC: 19 MG/DL (ref 7–30)
CALCIUM SERPL-MCNC: 10 MG/DL (ref 8.5–10.1)
CHLORIDE BLD-SCNC: 98 MMOL/L (ref 94–109)
CHOLEST SERPL-MCNC: 287 MG/DL
CO2 SERPL-SCNC: 29 MMOL/L (ref 20–32)
CREAT SERPL-MCNC: 1.01 MG/DL (ref 0.66–1.25)
CREAT UR-MCNC: 51 MG/DL
FASTING STATUS PATIENT QL REPORTED: YES
FASTING STATUS PATIENT QL REPORTED: YES
GFR SERPL CREATININE-BSD FRML MDRD: >90 ML/MIN/1.73M2
GLUCOSE BLD-MCNC: 272 MG/DL (ref 70–99)
GLUCOSE BLD-MCNC: 272 MG/DL (ref 70–99)
HBA1C MFR BLD: 8.7 % (ref 0–5.6)
HDLC SERPL-MCNC: 42 MG/DL
LDLC SERPL CALC-MCNC: 111 MG/DL
LDLC SERPL CALC-MCNC: ABNORMAL MG/DL
MICROALBUMIN UR-MCNC: 628 MG/L
MICROALBUMIN/CREAT UR: 1231.37 MG/G CR (ref 0–17)
NONHDLC SERPL-MCNC: 245 MG/DL
POTASSIUM BLD-SCNC: 4.2 MMOL/L (ref 3.4–5.3)
SODIUM SERPL-SCNC: 136 MMOL/L (ref 133–144)
TRIGL SERPL-MCNC: 1049 MG/DL

## 2022-08-02 PROCEDURE — 83036 HEMOGLOBIN GLYCOSYLATED A1C: CPT

## 2022-08-02 PROCEDURE — 36415 COLL VENOUS BLD VENIPUNCTURE: CPT

## 2022-08-02 PROCEDURE — G0108 DIAB MANAGE TRN  PER INDIV: HCPCS | Performed by: DIETITIAN, REGISTERED

## 2022-08-02 PROCEDURE — 80061 LIPID PANEL: CPT

## 2022-08-02 PROCEDURE — 83721 ASSAY OF BLOOD LIPOPROTEIN: CPT | Mod: 59

## 2022-08-02 PROCEDURE — 82043 UR ALBUMIN QUANTITATIVE: CPT

## 2022-08-02 PROCEDURE — 80048 BASIC METABOLIC PNL TOTAL CA: CPT

## 2022-08-02 RX ORDER — BLOOD SUGAR DIAGNOSTIC
STRIP MISCELLANEOUS
Qty: 50 STRIP | Refills: 11 | Status: SHIPPED | OUTPATIENT
Start: 2022-08-02

## 2022-08-02 NOTE — PATIENT INSTRUCTIONS
PLAN  1) will let Endocrine doc know that BG is elevated and he wonders about other options instead of increasing GLP-1  2) provided meter: Verio Reflect and will order strips to [facility]  3) take medications even on days where alcohol is consumed

## 2022-08-02 NOTE — LETTER
8/2/2022         RE: Ed Murcia  [address] [place] [address]        Dear Colleague,    Thank you for referring your patient, Ed Murcia, to the [facility]. Please see a copy of my visit note below.    Diabetes Self-Management Education & Support    Presents for: Individual review    Type of Visit: In Person    How would patient like to obtain AVS? In person    ASSESSMENT:  Shen has a ~ 10 year history of T2D with minimal ed at dx by report.  Elevated A1C and can benefit from diabetes care and ed.   Working with endo too.   He uses a Dexcom, see report below.   ~ 27% in target, no lows    Describes he was at a cabin party over the weekend and there was extra food and some beer drinking- he did not take any meds during this time due to concern about interaction of meds with alcohol.   Trulicity dose was increased in the past couple months and he reports having to leave work at be home sick for 2 days with dose increase, though tolerating now.   Needs additional med but would not like to increase Trulicity if another option is available.     Patient's most recent   Lab Results   Component Value Date    A1C 8.7 08/02/2022    is not meeting goal of <7.0 , though improved from 11.0 a year ago  ~ drawn after his visit today    Diabetes knowledge and skills assessment:   Patient is knowledgeable in diabetes management concepts related to: Healthy Eating, Being Active, Monitoring and Healthy Coping    Continue education with the following diabetes management concepts: Taking Medication, Problem Solving and Reducing Risks    Based on learning assessment above, most appropriate setting for further diabetes education would be: Individual setting.    INTERVENTIONS:    Education provided today on:  AADE Self-Care Behaviors:  Taking Medication: action of prescribed medication, side effects of prescribed medications, when to take medications and recommend continue meds even  if having an alcoholic beverage.   ~ reviewed if drinking daily or binging may need to reconsider Metformin, he denies this    Problem Solving: high blood glucose - causes, signs/symptoms, treatment and prevention, low blood glucose - causes, signs/symptoms, treatment and prevention and current med regimen is very low risk for hypoglycemia    Reducing Risks: A1C - goals, relating to blood glucose levels, discuss protecting health next visit    Opportunities for ongoing education and support in diabetes-self management were discussed. Pt verbalized understanding of concepts discussed and recommendations provided today.       Education Materials Provided:  Hypoglycemia Signs and Symptoms - offered and reviewed, he was familiar and did not take  ~ in case other med is added    PLAN  1) will let Endocrine doc know that BG is elevated and he wonders about other options instead of increasing GLP-1  2) provided meter: Verio Reflect and will order strips to [facility]  3) take medications even on days where alcohol is consumed    Topics to cover at upcoming visits: Taking Medication and Reducing Risks  Follow-up: recommended but not scheduled at this time, will wait for med plan from endo    See Goals Section for co-developed, patient-stated behavior change goals.  AVS available to patient today.    SUBJECTIVE / OBJECTIVE:  Presents for: Individual review  Accompanied by: Self  Diabetes education in the past 24mo: No  Focus of Visit: Other (trying to find a balance, get BG down)  Diabetes type: Type 2  Date of diagnosis: ~ 10 years  Disease course: Getting harder to manage  How confident are you filling out medical forms by yourself:: Somewhat  Diabetes management related comments/concerns: identifies that numbers are generally still elevated out of target, has some questions about meds  Other concerns:: None  Cultural Influences/Ethnic Background:  Not  or       Diabetes Symptoms & Complications:  Fatigue:  "Sometimes  Neuropathy: Yes (reports some in hands specifically- cold makes them hurt, can't feel hot, says all life)  Polydipsia: Sometimes  Polyphagia: No  Polyuria: Sometimes  Visual change: No  Slow healing wounds: No  Symptom course: Stable  Autonomic neuropathy: No  CVA: No  Heart disease: No  Nephropathy: No  Peripheral neuropathy: No  Peripheral Vascular Disease: No  Retinopathy: No  Sexual dysfunction: No    Patient Problem List and Family Medical History reviewed for relevant medical history, current medical status, and diabetes risk factors.    Vitals:  There were no vitals taken for this visit.  There is no height or weight on file to calculate BMI.   Last 3 BP:   BP Readings from Last 3 Encounters:   05/11/22 (!) 138/95       History   Smoking Status     Current Some Day Smoker     Types: Cigarettes, Cigars   Smokeless Tobacco     Never Used       Labs:  Lab Results   Component Value Date    A1C 8.7 08/02/2022     No results found for: GLC  No results found for: LDL  No results found for: HDL]  No results found for: GFRESTIMATED  No results found for: GFRESTBLACK  No results found for: CR  No results found for: MICROALBUMIN    Healthy Eating:  Healthy Eating Assessed Today: Yes  Cultural/Scientology diet restrictions?: No (some excema with milk, does ok with goat milk)  Meal planning/habits: High carb, Heart healthy (feels like he needs more carb to keep energy up, aiming for veg, potato, and meat at meals)  How many times a week on average do you eat food made away from home (restaurant/take-out)?: 1  Meals include: Breakfast, Lunch, Dinner (trying to eat healthier, reduced fast food, lower appetite with Trulicity)  Breakfast: eggs with veggies in it- variety- and toast,a FF yogurt  Lunch: at work- tries to bring, not always hungry- maybe nuts and apple, or leftovers like a burger  Dinner: pork chop with carrot and brussels, potato OR chick broccoli stir mora OR \"turkey street bowl\" OR a archana " sandwich/pasta dish  Snacks: fruit all variety, peanuts  Other: gets a Hello Fresh box with wife- likes this  Beverages: Water, Coffee, Alcohol (if uses pop it's SF, not much alcohol during week, more on a cabin weekend (5-6/day if at a cabin party))  Has patient met with a dietitian in the past?: Yes    Being Active:  Being Active Assessed Today: Yes  Exercise::  (works as  year round, on feet 8 hours/day, walks dog in AM, bikes)  Days per week of moderate to strenuous exercise (like a brisk walk): 7  On average, minutes per day of exercise at this level: 40  How intense was your typical exercise? : Moderate (like brisk walking)  Exercise Minutes per Week: 280  Barrier to exercise: None    Monitoring:  Monitoring Assessed Today: Yes (reports seeing 150-160 range, occasionally sees 120's to 400+, this AM at appt 199 which is down from waking number)  Did patient bring glucose meter to appointment? : Yes  Blood Glucose Meter: One Touch, CGM (dexcom, meter for back up - has not received meter)  Times checking blood sugar at home (number): Other  Times checking blood sugar at home (per): Day  Blood glucose trend: Fluctuating (but generally high)    Glucose data: only goes through 7/25              Taking Medications:  Diabetes Medication(s)     Biguanides       metFORMIN (GLUCOPHAGE XR) 500 MG 24 hr tablet    Take 4 tablets (2,000 mg) by mouth daily (with dinner)    Sodium-Glucose Co-Transporter 2 (SGLT2) Inhibitors       dapagliflozin (FARXIGA) 10 MG TABS tablet        Incretin Mimetic Agents (GLP-1 Receptor Agonists)       dulaglutide (TRULICITY) 1.5 MG/0.5ML pen    Inject 1.5 mg Subcutaneous every 7 days          Taking Medication Assessed Today: Yes  Current Treatments: Oral Medication (taken by mouth), Non-insulin Injectables (Metformin, Farxiga, Trulicity)  Problems taking diabetes medications regularly?: Yes (uncertain how they might interact with alcohol, so does not take meds if drinking- was at a  cabin party this weekend and didn't take for 4 days, due for Trulicity today)  Diabetes medication side effects?: No    Problem Solving:  Problem Solving Assessed Today: Yes  Is the patient at risk for hypoglycemia?: No  Is the patient at risk for DKA?: No  Does patient have severe weather/disaster plan for diabetes management?: Not Needed  Does patient have sick day plan for diabetes management?: Not Needed      Reducing Risks:  Reducing Risks Assessed Today: Yes  Diabetes Risks: Family History  CAD Risks: Diabetes Mellitus, Male sex  Has dilated eye exam at least once a year?: Yes (last year)  Sees dentist every 6 months?: No  Feet checked by healthcare provider in the last year?: Yes    Healthy Coping:  Healthy Coping Assessed Today: Yes  Emotional response to diabetes: Acceptance, Ready to learn, Concern for health and well-being  Informal Support system:: Children, Family, Friends, Spouse  Stage of change: ACTION (Actively working towards change)  Patient Activation Measure Survey Score:  No flowsheet data found.      Marah Saucedo RD, LD, [facility]    Time Spent: 45 minutes  Encounter Type: Individual        Any diabetes medication dose changes were made via the Certified Diabetes Care & Education Protocol in collaboration with the patient's referring provider. A copy of this encounter was shared with the provider.

## 2022-08-02 NOTE — Clinical Note
Hi Shen Jesus's Dexcom report is copied into the note here, BG remains elevated on Metformin XR, Farxiga, and Trulicity.  He is reluctant to increase Trulicity due to sick days with last dose increase, but will try if needed.  Would you recommend anything else? Glucotrol ER?  Wanted to let you know. A1C is improved but BG remains only 27% in range.  Thank you! Marah Saucedo RD, LD, [facility]

## 2022-08-02 NOTE — PROGRESS NOTES
Diabetes Self-Management Education & Support    Presents for: Individual review    Type of Visit: In Person    How would patient like to obtain AVS? In person    ASSESSMENT:  Shen has a ~ 10 year history of T2D with minimal ed at dx by report.  Elevated A1C and can benefit from diabetes care and ed.   Working with endo too.   He uses a Dexcom, see report below.   ~ 27% in target, no lows    Describes he was at a cabin party over the weekend and there was extra food and some beer drinking- he did not take any meds during this time due to concern about interaction of meds with alcohol.   Trulicity dose was increased in the past couple months and he reports having to leave work at be home sick for 2 days with dose increase, though tolerating now.   Needs additional med but would not like to increase Trulicity if another option is available.     Patient's most recent   Lab Results   Component Value Date    A1C 8.7 08/02/2022    is not meeting goal of <7.0 , though improved from 11.0 a year ago  ~ drawn after his visit today    Diabetes knowledge and skills assessment:   Patient is knowledgeable in diabetes management concepts related to: Healthy Eating, Being Active, Monitoring and Healthy Coping    Continue education with the following diabetes management concepts: Taking Medication, Problem Solving and Reducing Risks    Based on learning assessment above, most appropriate setting for further diabetes education would be: Individual setting.    INTERVENTIONS:    Education provided today on:  AADE Self-Care Behaviors:  Taking Medication: action of prescribed medication, side effects of prescribed medications, when to take medications and recommend continue meds even if having an alcoholic beverage.   ~ reviewed if drinking daily or binging may need to reconsider Metformin, he denies this    Problem Solving: high blood glucose - causes, signs/symptoms, treatment and prevention, low blood glucose - causes, signs/symptoms,  treatment and prevention and current med regimen is very low risk for hypoglycemia    Reducing Risks: A1C - goals, relating to blood glucose levels, discuss protecting health next visit    Opportunities for ongoing education and support in diabetes-self management were discussed. Pt verbalized understanding of concepts discussed and recommendations provided today.       Education Materials Provided:  Hypoglycemia Signs and Symptoms - offered and reviewed, he was familiar and did not take  ~ in case other med is added    PLAN  1) will let Endocrine doc know that BG is elevated and he wonders about other options instead of increasing GLP-1  2) provided meter: Verio Reflect and will order strips to [facility]  3) take medications even on days where alcohol is consumed    Topics to cover at upcoming visits: Taking Medication and Reducing Risks  Follow-up: recommended but not scheduled at this time, will wait for med plan from endo    See Goals Section for co-developed, patient-stated behavior change goals.  AVS available to patient today.    SUBJECTIVE / OBJECTIVE:  Presents for: Individual review  Accompanied by: Self  Diabetes education in the past 24mo: No  Focus of Visit: Other (trying to find a balance, get BG down)  Diabetes type: Type 2  Date of diagnosis: ~ 10 years  Disease course: Getting harder to manage  How confident are you filling out medical forms by yourself:: Somewhat  Diabetes management related comments/concerns: identifies that numbers are generally still elevated out of target, has some questions about meds  Other concerns:: None  Cultural Influences/Ethnic Background:  Not  or       Diabetes Symptoms & Complications:  Fatigue: Sometimes  Neuropathy: Yes (reports some in hands specifically- cold makes them hurt, can't feel hot, says all life)  Polydipsia: Sometimes  Polyphagia: No  Polyuria: Sometimes  Visual change: No  Slow healing wounds: No  Symptom course: Stable  Autonomic  "neuropathy: No  CVA: No  Heart disease: No  Nephropathy: No  Peripheral neuropathy: No  Peripheral Vascular Disease: No  Retinopathy: No  Sexual dysfunction: No    Patient Problem List and Family Medical History reviewed for relevant medical history, current medical status, and diabetes risk factors.    Vitals:  There were no vitals taken for this visit.  There is no height or weight on file to calculate BMI.   Last 3 BP:   BP Readings from Last 3 Encounters:   05/11/22 (!) 138/95       History   Smoking Status     Current Some Day Smoker     Types: Cigarettes, Cigars   Smokeless Tobacco     Never Used       Labs:  Lab Results   Component Value Date    A1C 8.7 08/02/2022     No results found for: GLC  No results found for: LDL  No results found for: HDL]  No results found for: GFRESTIMATED  No results found for: GFRESTBLACK  No results found for: CR  No results found for: MICROALBUMIN    Healthy Eating:  Healthy Eating Assessed Today: Yes  Cultural/Hinduism diet restrictions?: No (some excema with milk, does ok with goat milk)  Meal planning/habits: High carb, Heart healthy (feels like he needs more carb to keep energy up, aiming for veg, potato, and meat at meals)  How many times a week on average do you eat food made away from home (restaurant/take-out)?: 1  Meals include: Breakfast, Lunch, Dinner (trying to eat healthier, reduced fast food, lower appetite with Trulicity)  Breakfast: eggs with veggies in it- variety- and toast,a FF yogurt  Lunch: at work- tries to bring, not always hungry- maybe nuts and apple, or leftovers like a burger  Dinner: pork chop with carrot and brussels, potato OR chick broccoli stir mora OR \"turkey street bowl\" OR a archana sandwich/pasta dish  Snacks: fruit all variety, peanuts  Other: gets a Hello Fresh box with wife- likes this  Beverages: Water, Coffee, Alcohol (if uses pop it's SF, not much alcohol during week, more on a cabin weekend (5-6/day if at a cabin party))  Has patient met " with a dietitian in the past?: Yes    Being Active:  Being Active Assessed Today: Yes  Exercise::  (works as  year round, on feet 8 hours/day, walks dog in AM, bikes)  Days per week of moderate to strenuous exercise (like a brisk walk): 7  On average, minutes per day of exercise at this level: 40  How intense was your typical exercise? : Moderate (like brisk walking)  Exercise Minutes per Week: 280  Barrier to exercise: None    Monitoring:  Monitoring Assessed Today: Yes (reports seeing 150-160 range, occasionally sees 120's to 400+, this AM at appt 199 which is down from waking number)  Did patient bring glucose meter to appointment? : Yes  Blood Glucose Meter: One Touch, CGM (dexcom, meter for back up - has not received meter)  Times checking blood sugar at home (number): Other  Times checking blood sugar at home (per): Day  Blood glucose trend: Fluctuating (but generally high)    Glucose data: only goes through 7/25              Taking Medications:  Diabetes Medication(s)     Biguanides       metFORMIN (GLUCOPHAGE XR) 500 MG 24 hr tablet    Take 4 tablets (2,000 mg) by mouth daily (with dinner)    Sodium-Glucose Co-Transporter 2 (SGLT2) Inhibitors       dapagliflozin (FARXIGA) 10 MG TABS tablet        Incretin Mimetic Agents (GLP-1 Receptor Agonists)       dulaglutide (TRULICITY) 1.5 MG/0.5ML pen    Inject 1.5 mg Subcutaneous every 7 days          Taking Medication Assessed Today: Yes  Current Treatments: Oral Medication (taken by mouth), Non-insulin Injectables (Metformin, Farxiga, Trulicity)  Problems taking diabetes medications regularly?: Yes (uncertain how they might interact with alcohol, so does not take meds if drinking- was at a [place] party this weekend and didn't take for 4 days, due for Trulicity today)  Diabetes medication side effects?: No    Problem Solving:  Problem Solving Assessed Today: Yes  Is the patient at risk for hypoglycemia?: No  Is the patient at risk for DKA?: No  Does patient  have severe weather/disaster plan for diabetes management?: Not Needed  Does patient have sick day plan for diabetes management?: Not Needed      Reducing Risks:  Reducing Risks Assessed Today: Yes  Diabetes Risks: Family History  CAD Risks: Diabetes Mellitus, Male sex  Has dilated eye exam at least once a year?: Yes (last year)  Sees dentist every 6 months?: No  Feet checked by healthcare provider in the last year?: Yes    Healthy Coping:  Healthy Coping Assessed Today: Yes  Emotional response to diabetes: Acceptance, Ready to learn, Concern for health and well-being  Informal Support system:: Children, Family, Friends, Spouse  Stage of change: ACTION (Actively working towards change)  Patient Activation Measure Survey Score:  No flowsheet data found.      Marah Saucedo RD, LD, CDCES    Time Spent: 45 minutes  Encounter Type: Individual        Any diabetes medication dose changes were made via the Certified Diabetes Care & Education Protocol in collaboration with the patient's referring provider. A copy of this encounter was shared with the provider.

## 2022-08-05 DIAGNOSIS — E11.65 UNCONTROLLED TYPE 2 DIABETES MELLITUS WITH HYPERGLYCEMIA (H): Primary | ICD-10-CM

## 2022-08-08 ENCOUNTER — TELEPHONE (OUTPATIENT)
Dept: ENDOCRINOLOGY | Facility: CLINIC | Age: 37
End: 2022-08-08

## 2022-08-11 DIAGNOSIS — E78.1 HYPERTRIGLYCERIDEMIA: Primary | ICD-10-CM

## 2022-08-11 RX ORDER — FENOFIBRATE 145 MG/1
145 TABLET, COATED ORAL DAILY
Qty: 90 TABLET | Refills: 3 | Status: SHIPPED | OUTPATIENT
Start: 2022-08-11 | End: 2024-05-01

## 2022-08-31 DIAGNOSIS — E11.65 UNCONTROLLED TYPE 2 DIABETES MELLITUS WITH HYPERGLYCEMIA (H): Primary | ICD-10-CM

## 2022-08-31 RX ORDER — PROCHLORPERAZINE 25 MG/1
SUPPOSITORY RECTAL
Qty: 3 EACH | Refills: 3 | Status: SHIPPED | OUTPATIENT
Start: 2022-08-31 | End: 2022-11-01

## 2022-08-31 NOTE — TELEPHONE ENCOUNTER
Last office visit: 7/12/22  Future appt: 10/4/22    Sensors not on MAR  Will send to provider to approve  Deyanira Delacruz RN

## 2022-08-31 NOTE — TELEPHONE ENCOUNTER
Reason for Call:  Medication or medication refill:    Do you use a [facility]?  Name of the pharmacy and phone number for the current request:  PREVIOUS    Name of the medication requested: G6 SENSORS, TO PUT ON STOMACH TO BUT TRANSMITTER IN      Other request: NONE    Can we leave a detailed message on this number? YES    Phone number patient can be reached at: Home number on file [phone] (home)    Best Time: ANYTIME    Call taken on 8/31/2022 at 10:22 AM by Eileen Arroyo

## 2022-09-27 ENCOUNTER — LAB (OUTPATIENT)
Dept: LAB | Facility: CLINIC | Age: 37
End: 2022-09-27
Payer: COMMERCIAL

## 2022-09-27 DIAGNOSIS — E11.65 UNCONTROLLED TYPE 2 DIABETES MELLITUS WITH HYPERGLYCEMIA (H): ICD-10-CM

## 2022-09-27 LAB — HBA1C MFR BLD: 9.2 % (ref 0–5.6)

## 2022-09-27 PROCEDURE — 36415 COLL VENOUS BLD VENIPUNCTURE: CPT

## 2022-09-27 PROCEDURE — 83721 ASSAY OF BLOOD LIPOPROTEIN: CPT | Mod: 59

## 2022-09-27 PROCEDURE — 82043 UR ALBUMIN QUANTITATIVE: CPT

## 2022-09-27 PROCEDURE — 83036 HEMOGLOBIN GLYCOSYLATED A1C: CPT

## 2022-09-27 PROCEDURE — 80061 LIPID PANEL: CPT

## 2022-09-28 LAB
CHOLEST SERPL-MCNC: 268 MG/DL
CREAT UR-MCNC: 98 MG/DL
FASTING STATUS PATIENT QL REPORTED: YES
HDLC SERPL-MCNC: 39 MG/DL
LDLC SERPL CALC-MCNC: 144 MG/DL
LDLC SERPL CALC-MCNC: ABNORMAL MG/DL
MICROALBUMIN UR-MCNC: 615 MG/L
MICROALBUMIN/CREAT UR: 627.55 MG/G CR (ref 0–17)
NONHDLC SERPL-MCNC: 229 MG/DL
TRIGL SERPL-MCNC: 611 MG/DL

## 2022-09-30 NOTE — RESULT ENCOUNTER NOTE
"Dear Shen,     Here are your recent results. We can discuss further next week, but I think we need to strongly consider at least one dose of insulin-- most things are either stable or heading in the wrong direction and we need to get a new plan figured out. I\"m open-- as I know your goal was to avoid insulin, so we can discuss more next week.    Please let us know if you have any questions or concerns.    Regards,  Anjali Forte MD "

## 2022-10-04 ENCOUNTER — VIRTUAL VISIT (OUTPATIENT)
Dept: ENDOCRINOLOGY | Facility: CLINIC | Age: 37
End: 2022-10-04
Payer: COMMERCIAL

## 2022-10-04 DIAGNOSIS — E11.65 UNCONTROLLED TYPE 2 DIABETES MELLITUS WITH HYPERGLYCEMIA (H): Primary | ICD-10-CM

## 2022-10-04 DIAGNOSIS — E78.1 HYPERTRIGLYCERIDEMIA: ICD-10-CM

## 2022-10-04 PROBLEM — E11.9 DIABETES MELLITUS, TYPE 2 (H): Status: ACTIVE | Noted: 2022-10-04

## 2022-10-04 PROCEDURE — 95251 CONT GLUC MNTR ANALYSIS I&R: CPT | Performed by: INTERNAL MEDICINE

## 2022-10-04 PROCEDURE — 99215 OFFICE O/P EST HI 40 MIN: CPT | Mod: GT | Performed by: INTERNAL MEDICINE

## 2022-10-04 RX ORDER — BLOOD PRESSURE TEST KIT
KIT MISCELLANEOUS
Qty: 100 EACH | Refills: 11 | Status: SHIPPED | OUTPATIENT
Start: 2022-10-04

## 2022-10-04 RX ORDER — DEXAMETHASONE SODIUM PHOSPHATE 4 MG/ML
1 INJECTION, SOLUTION INTRAMUSCULAR; INTRAVENOUS DAILY
Qty: 1 EACH | Refills: 0 | Status: SHIPPED | OUTPATIENT
Start: 2022-10-04

## 2022-10-04 RX ORDER — LISINOPRIL 5 MG/1
5 TABLET ORAL DAILY
Qty: 90 TABLET | Refills: 3 | Status: SHIPPED | OUTPATIENT
Start: 2022-10-04 | End: 2023-09-28

## 2022-10-04 ASSESSMENT — PAIN SCALES - GENERAL: PAINLEVEL: NO PAIN (0)

## 2022-10-04 NOTE — NURSING NOTE
Chief Complaint   Patient presents with     Follow Up     Uncontrolled type 2 diabetes mellitus with hyperglycemia (H)       There were no vitals filed for this visit.    There is no height or weight on file to calculate BMI.    Sara Chance, JUAN MANUELF

## 2022-10-04 NOTE — PROGRESS NOTES
"  Video-Visit Details    Type of service:  Video Visit  Video Start Time: 10:35  Video End Time:11:07  Originating Location (pt. Location): [place], [place]  Distant Location (provider location):  Home  Platform used for Video Visit: Ban Forte MD    Ed Murcia is a 37 year old year old male here for follow-up of diabetes mellitus, hypertriglyceridemia via a billable video visit.    Has PCP at [facility] (not available in CareEverywhere). He also has PMHx of ADHD and hypertriglyceridemia    INTERVAL HISTORY:  - Wearing dexcom,  Transmitter not working and got new one so was off for a back  - On Trulicity 1.5mg weekly, minimal change in BG  - Feels that he is overall eating as healthy as he can, staying physically active and yet not making any progress with his blood glucoses      1) Diabetes Mellitus-- Type 2    Diabetes History:  Diagnosis: 2013, noticed polyuria/polydypsia, had screen done and diagnosed with DM2, some weight loss minimal at time of diagnosis  Hospitalizations: none  Previous Regimens: none  Current Regimen: Metformin 2000mg XR at night, Farxiga 10mg daily, Trulicity 1.5mg (first dose today)    BG check- Dexcom  Trends-                Denies polyuria, polydypsia, overall feels very well    Complications: no retinopathy, recent screening August, does have nephropathy  Fam: aunt Paternal w/ DM    Last eye exam: Completed end August, no retinopathy \"eyes look better than before\"  Foot Exam: completed at previous visit  ACEI/ARB: not now, ZACHARY elevated x2, on SGLT-2  Statin/ASA: not currently taking, on fish oil      2) Hypertriglyceridemia  - Longstanding, currently on Fenofibrate between 8/2-->9/27- with improving levels (1049-->611)  - Taking fish oil once daily      BP Readings from Last 3 Encounters:   05/11/22 (!) 138/95       1/20/2022-  A1C- 9.5%  CBC- wnl  CMP- wnl (glucose 227)  Lipids- , HDL 38, , LDL (too high)  UA- No urine protein      No results found for: " A1C      No results for input(s): CHOL, HDL, LDL, TRIG, CHOLHDLRATIO in the last 66200 hours.    No results found for: MICROL  No results found for: MICROALBUMIN      Wt Readings from Last 3 Encounters:   05/11/22 79.9 kg (176 lb 3.2 oz)       Current Outpatient Medications   Medication Sig Dispense Refill     blood glucose (NO BRAND SPECIFIED) lancets standard Use to test blood sugar 2 times daily or as directed. 100 each 3     blood glucose (NO BRAND SPECIFIED) test strip Use to test blood sugar 2 times daily or as directed. 100 strip 3     blood glucose (ONETOUCH VERIO IQ) test strip Use to test blood sugar up to 1 time daily. 50 strip 11     Continuous Blood Gluc Sensor (DEXCOM G6 SENSOR) MISC Change every 10 days. 3 each 3     dapagliflozin (FARXIGA) 10 MG TABS tablet        dulaglutide (TRULICITY) 1.5 MG/0.5ML pen Inject 1.5 mg Subcutaneous every 7 days 2 mL 3     escitalopram (LEXAPRO) 10 MG tablet        fenofibrate (TRICOR) 145 MG tablet Take 1 tablet (145 mg) by mouth daily 90 tablet 3     fish oil-omega-3 fatty acids 1000 MG capsule        metFORMIN (GLUCOPHAGE XR) 500 MG 24 hr tablet Take 4 tablets (2,000 mg) by mouth daily (with dinner) 360 tablet 3     blood glucose monitoring (ONE TOUCH ULTRA 2) meter device kit Use to test blood sugar 2 times daily or as directed. 1 kit 0       Histories reviewed and updated in Epic.  Past Medical History:   Diagnosis Date     Attention deficit disorder without mention of hyperactivity        No past surgical history on file.    Allergies:  Seasonale    Social History     Tobacco Use     Smoking status: Current Some Day Smoker     Types: Cigarettes, Cigars     Smokeless tobacco: Never Used   Substance Use Topics     Alcohol use: Yes       No family history on file.       REVIEW OF SYSTEMS:   ROS: 10 point ROS neg other than the symptoms noted above in the HPI.      EXAM:  Vitals: There were no vitals taken for this visit.    BMIE= There is no height or weight on file  to calculate BMI.  Physical Exam (visual exam)  VS:  no vital signs taken for video visit  CONSTITUTIONAL: healthy, alert and NAD, responding appropriately  ENT: normocephalic, no visual evidence of trauma, normal nose and oral mucosa  EYES: conjunctivae and sclerae normal, no exophthalmos or proptosis  THYROID:  no visualized nodules or goiter  LUNGS: no audible wheeze, cough or visible cyanosis, no visible retractions or increased work of breathing  EXTREMITIES: no hand tremors  NEUROLOGY: cranial nerves grossly intact with no obvious deficit.  SKIN:  no visualized skin lesions or rash, no edema visualized  PSYCH: mentation appears normal, normal judgement      ASSESSMENT/PLAN:  No orders of the defined types were placed in this encounter.      1) Diabetes Mellitus -- NOT at goal, last a1c 9.2% 9/27  - Glucose Control-    - Will start basal insulin 0.2u/kg-- 15u daily   - Continue Trulicty 1.5mg weekly   - Continue metformin, Farxia, and dietary changes   - Continue Dexcom   - Will meet with DM educator for continued review of lifestyle changes and adjustment of insulin-- discussed that if after 2-3 weeks he is not seeing improvement in BG levels, will need to add mealtime insulin as well-- we need to be more aggressive with getting BG down and then can try to optimize regimen after that.   - Added c-peptide and MURPHY antibody to labs for testing for DM1 with next blood draw  - Referral to nephrology for management of sig proteinuia. Will retest, seeing some improvement and regression of retinopathy so if persistent can consider other etiologies.       2) Hypertriglyceridemia   - On Fenofibrate, Fish Oil   -Recheck in August when comes in   - Will add insulin, but should add atorvastatin as well at next visit with low dose for lowering of all cholesterol levels/CV benefit. Making many changes so will delay this until next visit    RTC- 3 months    A total of 45 minutes were spent today 10/04/22 on this visit  including chart review, history and counseling, documentation and other activities as detailed above.     Answers for HPI/ROS submitted by the patient on 9/27/2022  General Symptoms: No  Skin Symptoms: No  HENT Symptoms: No  EYE SYMPTOMS: No  HEART SYMPTOMS: No  LUNG SYMPTOMS: No  INTESTINAL SYMPTOMS: No  URINARY SYMPTOMS: No  REPRODUCTIVE SYMPTOMS: No  SKELETAL SYMPTOMS: No  BLOOD SYMPTOMS: No  NERVOUS SYSTEM SYMPTOMS: No  MENTAL HEALTH SYMPTOMS: No

## 2022-10-04 NOTE — Clinical Note
Hey there-- he's starting insulin, can you check in with him end of this week/early next week to ensure he's doing ok? Also likely will need some titration. May need prandial insulin, which we will revisit in 4 weeks.

## 2022-10-17 ENCOUNTER — PATIENT OUTREACH (OUTPATIENT)
Dept: EDUCATION SERVICES | Facility: CLINIC | Age: 37
End: 2022-10-17

## 2022-10-17 DIAGNOSIS — E78.1 HYPERTRIGLYCERIDEMIA: ICD-10-CM

## 2022-10-17 DIAGNOSIS — E11.65 UNCONTROLLED TYPE 2 DIABETES MELLITUS WITH HYPERGLYCEMIA (H): Primary | ICD-10-CM

## 2022-10-17 NOTE — PROGRESS NOTES
Shen started Lantus insulin a couple weeks ago.   CDE asked for outreach to see how he is doing.     Taking diabetes medications?   yes:     Diabetes Medication(s)     Biguanides       metFORMIN (GLUCOPHAGE XR) 500 MG 24 hr tablet    Take 4 tablets (2,000 mg) by mouth daily (with dinner)    Insulin       insulin glargine (LANTUS PEN) 100 UNIT/ML pen    Inject 15 Units Subcutaneous At Bedtime    Sodium-Glucose Co-Transporter 2 (SGLT2) Inhibitors       dapagliflozin (FARXIGA) 10 MG TABS tablet        Incretin Mimetic Agents (GLP-1 Receptor Agonists)       dulaglutide (TRULICITY) 1.5 MG/0.5ML pen    Inject 1.5 mg Subcutaneous every 7 days          Dexcom:      Outgoing message to Shen:  Hi Shen!   Pallavi asked if I would touch base with you to see how you are doing since the insulin start?  Is that going ok? Let us know if you need anything!     I took a quick look at the Dexcom report for Pallavi, looks like you need a little insulin adjustment before your November 1 follow up appointment.   It's showing 16% of time numbers are in target with average of 264.    Let's do a bump to the insulin.  If you've been taking 15 units each night, please increase to 20 units.  ~ if you've been doing something different, please let me know and I'll adjust accordingly    If still averaging over 200 during the next few days, please increase to 23 units Friday night.    Thank you! Marah Saucedo RD, LENNOX, SHERRY    (Insulin increase of up to 0.1 unit/kg = 8 units with >75% BG >175)  Marah Saucedo RD, LENNOX, CDCES

## 2022-11-01 ENCOUNTER — VIRTUAL VISIT (OUTPATIENT)
Dept: ENDOCRINOLOGY | Facility: CLINIC | Age: 37
End: 2022-11-01
Payer: COMMERCIAL

## 2022-11-01 DIAGNOSIS — E11.65 UNCONTROLLED TYPE 2 DIABETES MELLITUS WITH HYPERGLYCEMIA (H): Primary | ICD-10-CM

## 2022-11-01 DIAGNOSIS — E78.1 HYPERTRIGLYCERIDEMIA: ICD-10-CM

## 2022-11-01 PROCEDURE — 99215 OFFICE O/P EST HI 40 MIN: CPT | Mod: GT | Performed by: INTERNAL MEDICINE

## 2022-11-01 RX ORDER — PROCHLORPERAZINE 25 MG/1
SUPPOSITORY RECTAL
Qty: 9 EACH | Refills: 3 | OUTPATIENT
Start: 2022-11-01 | End: 2022-11-01

## 2022-11-01 RX ORDER — DULAGLUTIDE 3 MG/.5ML
3 INJECTION, SOLUTION SUBCUTANEOUS WEEKLY
Qty: 6 ML | Refills: 3 | Status: SHIPPED | OUTPATIENT
Start: 2022-11-01

## 2022-11-01 RX ORDER — PROCHLORPERAZINE 25 MG/1
SUPPOSITORY RECTAL
Qty: 9 EACH | Refills: 3 | Status: SHIPPED | OUTPATIENT
Start: 2022-11-01 | End: 2023-02-21

## 2022-11-01 NOTE — PROGRESS NOTES
"  Video-Visit Details    Type of service:  Video Visit  Video Start Time: 12:00  Video End Time:12:31  Originating Location (pt. Location): Home, [place]  Distant Location (provider location):  Home  Platform used for Video Visit: Ban Forte MD    Ed Murcia is a 37 year old year old male here for follow-up of diabetes mellitus, hypertriglyceridemia via a billable video visit.    Has PCP at [facility] (not available in [facility]where). He also has PMHx of ADHD and hypertriglyceridemia    INTERVAL HISTORY:  - Wearing dexcom,  Transmitter not working and got new one so was off for a back  - On Trulicity 1.5mg weekly, minimal change in BG  - Feels that he is overall eating as healthy as he can, staying physically active and yet not making any progress with his blood glucoses      1) Diabetes Mellitus-- Type 2    Diabetes History:  Diagnosis: 2013, noticed polyuria/polydypsia, had screen done and diagnosed with DM2, some weight loss minimal at time of diagnosis  Hospitalizations: none  Previous Regimens: none  Current Regimen: Metformin 2000mg XR at night, Farxiga 10mg daily, Trulicity 1.5mg (first dose today), Lantus 35u nightly    INTERVAL HISTORY:  - Picked up new RX for 35u and has been taking this since the last week, increased from 20-->35 after picking up prescription  - Has had a few numbers in the 80s, feels a little off and will have a little juice at that time  - Overall feeling better compared to a month ago as numbers are improving  - No overt hypoglycemia      BG check- Dexcom  Trends-                    Denies polyuria, polydypsia, overall feels very well    Complications: no retinopathy, recent screening August, does have nephropathy  Fam: aunt Paternal w/ DM    Last eye exam: Completed end August, no retinopathy \"eyes look better than before\"  Foot Exam: completed at previous visit  ACEI/ARB: not now, ZACHARY elevated x2, on SGLT-2  Statin/ASA: not currently taking, on fish oil      2) " Hypertriglyceridemia  - Longstanding, currently on Fenofibrate between 8/2-->9/27- with improving levels (1049-->611)  - Taking fish oil once daily      BP Readings from Last 3 Encounters:   05/11/22 (!) 138/95       1/20/2022-  A1C- 9.5%  CBC- wnl  CMP- wnl (glucose 227)  Lipids- , HDL 38, , LDL (too high)  UA- No urine protein      No results found for: A1C      No results for input(s): CHOL, HDL, LDL, TRIG, CHOLHDLRATIO in the last 46867 hours.    No results found for: MICROL  No results found for: MICROALBUMIN      Wt Readings from Last 3 Encounters:   05/11/22 79.9 kg (176 lb 3.2 oz)       Current Outpatient Medications   Medication Sig Dispense Refill     Alcohol Swabs PADS Use on skin prior to fingertip glucose testing 3x per day 100 each 11     BD [employer] MISC 1 each daily 1 each 0     blood glucose (NO BRAND SPECIFIED) lancets standard Use to test blood sugar 2 times daily or as directed. 100 each 3     blood glucose (NO BRAND SPECIFIED) test strip Use to test blood sugar 2 times daily or as directed. 100 strip 3     blood glucose (ONETOUCH VERIO IQ) test strip Use to test blood sugar up to 1 time daily. 50 strip 11     Continuous Blood Gluc Sensor (DEXCOM G6 SENSOR) MISC Change every 10 days. 3 each 3     dapagliflozin (FARXIGA) 10 MG TABS tablet        dulaglutide (TRULICITY) 1.5 MG/0.5ML pen Inject 1.5 mg Subcutaneous every 7 days 2 mL 3     escitalopram (LEXAPRO) 10 MG tablet        fenofibrate (TRICOR) 145 MG tablet Take 1 tablet (145 mg) by mouth daily 90 tablet 3     fish oil-omega-3 fatty acids 1000 MG capsule        insulin glargine (LANTUS PEN) 100 UNIT/ML pen Inject up to 35 units nightly as directed. 15 mL 3     insulin pen needle (32G X 4 MM) 32G X 4 MM miscellaneous Use 1 pen needles daily or as directed. 90 each 3     lisinopril (ZESTRIL) 5 MG tablet Take 1 tablet (5 mg) by mouth daily 90 tablet 3     metFORMIN (GLUCOPHAGE XR) 500 MG 24 hr tablet Take 4 tablets  (2,000 mg) by mouth daily (with dinner) 360 tablet 3     blood glucose monitoring (ONE TOUCH ULTRA 2) meter device kit Use to test blood sugar 2 times daily or as directed. 1 kit 0       Histories reviewed and updated in Epic.  Past Medical History:   Diagnosis Date     Attention deficit disorder without mention of hyperactivity        No past surgical history on file.    Allergies:  Seasonale    Social History     Tobacco Use     Smoking status: Some Days     Types: Cigarettes, Cigars     Smokeless tobacco: Never   Substance Use Topics     Alcohol use: Yes       No family history on file.       REVIEW OF SYSTEMS:   ROS: 10 point ROS neg other than the symptoms noted above in the HPI.      EXAM:  Vitals: There were no vitals taken for this visit.    BMIE= There is no height or weight on file to calculate BMI.    Physical Exam (visual exam)  VS:  no vital signs taken for video visit  CONSTITUTIONAL: healthy, alert and NAD, responding appropriately  ENT: normocephalic, no visual evidence of trauma, normal nose and oral mucosa  EYES: conjunctivae and sclerae normal, no exophthalmos or proptosis  THYROID:  no visualized nodules or goiter  LUNGS: no audible wheeze, cough or visible cyanosis, no visible retractions or increased work of breathing  EXTREMITIES: no hand tremors  NEUROLOGY: cranial nerves grossly intact with no obvious deficit.  SKIN:  no visualized skin lesions or rash, no edema visualized  PSYCH: mentation appears normal, normal judgement      ASSESSMENT/PLAN:  No orders of the defined types were placed in this encounter.      1) Diabetes Mellitus -- NOT at goal but SIGNIFICANTLY improved-- 3% in range to 54% in range, last a1c 9.2% 9/27  - Glucose Control-    - Continue current dose basaglar 35u nightly, reviewed s/s of hypoglycemia an dtreatment of it   - INCREASE Trulicty 1.5mg-->3.0mg weekly   - Continue metformin, Farxia, and dietary changes-- interested in trialing reduced metformin for decreased pill  burden. OK to try one week of 1000mg daily to see how he does.    - Continue Dexcom   - Will continue to meet with DM educator for continued review of lifestyle changes and adjustment of insulin--BG are substantially improved for now and will increase non-insulin therapies to avoid mealtime. Also could benefit from one mealtime dose aroudn noon meal to prevent this rise. .   - Added c-peptide and MURPHY antibody to labs for testing for DM1 with next blood draw-- reordered these as not completed yet  - Referral to nephrology for management of sig proteinuia. Will retest, seeing some improvement and regression of retinopathy so if persistent can consider other etiologies. -- has follow-up in December 2) Hypertriglyceridemia   - On Fenofibrate, Fish Oil   -Recheck with next labs, need to add statin at next visit   - Will add insulin, but should add atorvastatin as well at next visit with low dose for lowering of all cholesterol levels/CV benefit. Making many changes so will delay this until next visit    RTC- 3 months    A total of 40 minutes were spent today 11/01/22 on this visit including chart review, history and counseling, documentation and other activities as detailed above.       Answers for HPI/ROS submitted by the patient on 10/25/2022  General Symptoms: No  Skin Symptoms: No  HENT Symptoms: No  EYE SYMPTOMS: No  HEART SYMPTOMS: No  LUNG SYMPTOMS: No  INTESTINAL SYMPTOMS: No  URINARY SYMPTOMS: No  REPRODUCTIVE SYMPTOMS: No  SKELETAL SYMPTOMS: No  BLOOD SYMPTOMS: No  NERVOUS SYSTEM SYMPTOMS: No  MENTAL HEALTH SYMPTOMS: No

## 2022-11-01 NOTE — LETTER
11/1/2022         RE: Ed Murcia  [address] [place] [address]        Dear Colleague,    Thank you for referring your patient, Ed Murcia, to the [facility] [place]. Please see a copy of my visit note below.      Video-Visit Details    Type of service:  Video Visit  Video Start Time: 12:00  Video End Time:12:31  Originating Location (pt. Location): Home, [place]  Distant Location (provider location):  Home  Platform used for Video Visit: Ban Forte MD    Ed Murcia is a 37 year old year old male here for follow-up of diabetes mellitus, hypertriglyceridemia via a billable video visit.    Has PCP at [facility] (not available in [facility]). He also has PMHx of ADHD and hypertriglyceridemia    INTERVAL HISTORY:  - Wearing dexcom,  Transmitter not working and got new one so was off for a back  - On Trulicity 1.5mg weekly, minimal change in BG  - Feels that he is overall eating as healthy as he can, staying physically active and yet not making any progress with his blood glucoses      1) Diabetes Mellitus-- Type 2    Diabetes History:  Diagnosis: 2013, noticed polyuria/polydypsia, had screen done and diagnosed with DM2, some weight loss minimal at time of diagnosis  Hospitalizations: none  Previous Regimens: none  Current Regimen: Metformin 2000mg XR at night, Farxiga 10mg daily, Trulicity 1.5mg (first dose today), Lantus 35u nightly    INTERVAL HISTORY:  - Picked up new RX for 35u and has been taking this since the last week, increased from 20-->35 after picking up prescription  - Has had a few numbers in the 80s, feels a little off and will have a little juice at that time  - Overall feeling better compared to a month ago as numbers are improving  - No overt hypoglycemia      BG check- Dexcom  Trends-                    Denies polyuria, polydypsia, overall feels very well    Complications: no retinopathy, recent screening August, does have  "nephropathy  Fam: aunt Paternal w/ DM    Last eye exam: Completed end August, no retinopathy \"eyes look better than before\"  Foot Exam: completed at previous visit  ACEI/ARB: not now, ZACHARY elevated x2, on SGLT-2  Statin/ASA: not currently taking, on fish oil      2) Hypertriglyceridemia  - Longstanding, currently on Fenofibrate between 8/2-->9/27- with improving levels (1049-->611)  - Taking fish oil once daily      BP Readings from Last 3 Encounters:   05/11/22 (!) 138/95       1/20/2022-  A1C- 9.5%  CBC- wnl  CMP- wnl (glucose 227)  Lipids- , HDL 38, , LDL (too high)  UA- No urine protein      No results found for: A1C      No results for input(s): CHOL, HDL, LDL, TRIG, CHOLHDLRATIO in the last 22572 hours.    No results found for: MICROL  No results found for: MICROALBUMIN      Wt Readings from Last 3 Encounters:   05/11/22 79.9 kg (176 lb 3.2 oz)       Current Outpatient Medications   Medication Sig Dispense Refill     Alcohol Swabs PADS Use on skin prior to fingertip glucose testing 3x per day 100 each 11     BD [employer] MISC 1 each daily 1 each 0     blood glucose (NO BRAND SPECIFIED) lancets standard Use to test blood sugar 2 times daily or as directed. 100 each 3     blood glucose (NO BRAND SPECIFIED) test strip Use to test blood sugar 2 times daily or as directed. 100 strip 3     blood glucose (ONETOUCH VERIO IQ) test strip Use to test blood sugar up to 1 time daily. 50 strip 11     Continuous Blood Gluc Sensor (DEXCOM G6 SENSOR) MISC Change every 10 days. 3 each 3     dapagliflozin (FARXIGA) 10 MG TABS tablet        dulaglutide (TRULICITY) 1.5 MG/0.5ML pen Inject 1.5 mg Subcutaneous every 7 days 2 mL 3     escitalopram (LEXAPRO) 10 MG tablet        fenofibrate (TRICOR) 145 MG tablet Take 1 tablet (145 mg) by mouth daily 90 tablet 3     fish oil-omega-3 fatty acids 1000 MG capsule        insulin glargine (LANTUS PEN) 100 UNIT/ML pen Inject up to 35 units nightly as directed. 15 mL 3 "     insulin pen needle (32G X 4 MM) 32G X 4 MM miscellaneous Use 1 pen needles daily or as directed. 90 each 3     lisinopril (ZESTRIL) 5 MG tablet Take 1 tablet (5 mg) by mouth daily 90 tablet 3     metFORMIN (GLUCOPHAGE XR) 500 MG 24 hr tablet Take 4 tablets (2,000 mg) by mouth daily (with dinner) 360 tablet 3     blood glucose monitoring (ONE TOUCH ULTRA 2) meter device kit Use to test blood sugar 2 times daily or as directed. 1 kit 0       Histories reviewed and updated in Epic.  Past Medical History:   Diagnosis Date     Attention deficit disorder without mention of hyperactivity        No past surgical history on file.    Allergies:  Seasonale    Social History     Tobacco Use     Smoking status: Some Days     Types: Cigarettes, Cigars     Smokeless tobacco: Never   Substance Use Topics     Alcohol use: Yes       No family history on file.       REVIEW OF SYSTEMS:   ROS: 10 point ROS neg other than the symptoms noted above in the HPI.      EXAM:  Vitals: There were no vitals taken for this visit.    BMIE= There is no height or weight on file to calculate BMI.    Physical Exam (visual exam)  VS:  no vital signs taken for video visit  CONSTITUTIONAL: healthy, alert and NAD, responding appropriately  ENT: normocephalic, no visual evidence of trauma, normal nose and oral mucosa  EYES: conjunctivae and sclerae normal, no exophthalmos or proptosis  THYROID:  no visualized nodules or goiter  LUNGS: no audible wheeze, cough or visible cyanosis, no visible retractions or increased work of breathing  EXTREMITIES: no hand tremors  NEUROLOGY: cranial nerves grossly intact with no obvious deficit.  SKIN:  no visualized skin lesions or rash, no edema visualized  PSYCH: mentation appears normal, normal judgement      ASSESSMENT/PLAN:  No orders of the defined types were placed in this encounter.      1) Diabetes Mellitus -- NOT at goal but SIGNIFICANTLY improved-- 3% in range to 54% in range, last a1c 9.2% 9/27  - Glucose  Control-    - Continue current dose basaglar 35u nightly, reviewed s/s of hypoglycemia an dtreatment of it   - INCREASE Trulicty 1.5mg-->3.0mg weekly   - Continue metformin, Farxia, and dietary changes-- interested in trialing reduced metformin for decreased pill burden. OK to try one week of 1000mg daily to see how he does.    - Continue Dexcom   - Will continue to meet with DM educator for continued review of lifestyle changes and adjustment of insulin--BG are substantially improved for now and will increase non-insulin therapies to avoid mealtime. Also could benefit from one mealtime dose aroudn noon meal to prevent this rise. .   - Added c-peptide and MURPHY antibody to labs for testing for DM1 with next blood draw-- reordered these as not completed yet  - Referral to nephrology for management of sig proteinuia. Will retest, seeing some improvement and regression of retinopathy so if persistent can consider other etiologies. -- has follow-up in December 2) Hypertriglyceridemia   - On Fenofibrate, Fish Oil   -Recheck with next labs, need to add statin at next visit   - Will add insulin, but should add atorvastatin as well at next visit with low dose for lowering of all cholesterol levels/CV benefit. Making many changes so will delay this until next visit    RTC- 3 months    A total of 40 minutes were spent today 11/01/22 on this visit including chart review, history and counseling, documentation and other activities as detailed above.       Answers for HPI/ROS submitted by the patient on 10/25/2022  General Symptoms: No  Skin Symptoms: No  HENT Symptoms: No  EYE SYMPTOMS: No  HEART SYMPTOMS: No  LUNG SYMPTOMS: No  INTESTINAL SYMPTOMS: No  URINARY SYMPTOMS: No  REPRODUCTIVE SYMPTOMS: No  SKELETAL SYMPTOMS: No  BLOOD SYMPTOMS: No  NERVOUS SYSTEM SYMPTOMS: No  MENTAL HEALTH SYMPTOMS: No          Again, thank you for allowing me to participate in the care of your patient.        Sincerely,        Anjali Forte,  MD

## 2022-11-01 NOTE — NURSING NOTE
Chief Complaint   Patient presents with     Follow Up     Uncontrolled type 2 diabetes mellitus with hyperglycemia (H) +1 more       There were no vitals filed for this visit.    There is no height or weight on file to calculate BMI.     Sara Chance, [facility]

## 2022-11-02 ENCOUNTER — TELEPHONE (OUTPATIENT)
Dept: ENDOCRINOLOGY | Facility: CLINIC | Age: 37
End: 2022-11-02

## 2022-11-04 ENCOUNTER — TELEPHONE (OUTPATIENT)
Dept: ENDOCRINOLOGY | Facility: CLINIC | Age: 37
End: 2022-11-04

## 2022-11-04 NOTE — TELEPHONE ENCOUNTER
Prior Authorization Retail Medication Request    Medication/Dose: Dulaglutide (TRULICITY) 3 MG/0.5ML SOPN  ICD code (if different than what is on RX):  [E11.65]     Insurance ID:  [id number]   Insurance Name:  [facility]      Pharmacy Information (if different than what is on RX)  Name:  [facility]  Phone:  [phone]

## 2022-11-07 NOTE — TELEPHONE ENCOUNTER
PA Initiation    Medication: Dulaglutide (TRULICITY) 3 MG/0.5ML SOPN  Insurance Company: [employer] - Phone [phone] Fax [phone]  Pharmacy Filling the Rx: [facility] [address] [place] [address]  Filling Pharmacy Phone: [phone]  Filling Pharmacy Fax: [phone]  Start Date: 11/7/2022    IJEOMA CHOUDHARY Key: [id number]

## 2022-11-07 NOTE — TELEPHONE ENCOUNTER
Prior Authorization Not Needed per Insurance    Medication: Dulaglutide (TRULICITY) 3 MG/0.5ML SOPN  Insurance Company: [employer] - Phone [phone] Fax [phone]  Expected CoPay:      Pharmacy Filling the Rx: [facility] [address] [place] [address]  Pharmacy Notified: Yes  Patient Notified: Yes    Plan only allows 1 month supply per fill. Pharmacy received paid claim.

## 2022-11-20 ENCOUNTER — HEALTH MAINTENANCE LETTER (OUTPATIENT)
Age: 37
End: 2022-11-20

## 2022-11-23 DIAGNOSIS — E11.9 DIABETES MELLITUS, TYPE 2 (H): Primary | ICD-10-CM

## 2022-12-01 ENCOUNTER — TELEPHONE (OUTPATIENT)
Dept: ENDOCRINOLOGY | Facility: CLINIC | Age: 37
End: 2022-12-01

## 2022-12-01 NOTE — TELEPHONE ENCOUNTER
Central Prior Authorization Team   Phone: [phone]      Prior Authorization Not Needed per Insurance    Medication: lisinopril (ZESTRIL) 5 MG tablet--NOT NEEDED  Insurance Company: [employer] - Phone [phone] Fax [phone]  Expected CoPay:      Pharmacy Filling the Rx: [facility] [address] [place] [address], [place] - [address] [place] [address]  Pharmacy Notified: Yes INSURANCE WANTS PATIENT TO USE THEIR MAIL ORDER  Patient Notified: Yes LEFT MESSAGE

## 2022-12-02 ENCOUNTER — LAB (OUTPATIENT)
Dept: LAB | Facility: CLINIC | Age: 37
End: 2022-12-02
Payer: COMMERCIAL

## 2022-12-02 DIAGNOSIS — E78.1 HYPERTRIGLYCERIDEMIA: ICD-10-CM

## 2022-12-02 DIAGNOSIS — E11.65 UNCONTROLLED TYPE 2 DIABETES MELLITUS WITH HYPERGLYCEMIA (H): ICD-10-CM

## 2022-12-02 DIAGNOSIS — E11.9 DIABETES MELLITUS, TYPE 2 (H): Primary | ICD-10-CM

## 2022-12-02 DIAGNOSIS — E11.9 DIABETES MELLITUS, TYPE 2 (H): ICD-10-CM

## 2022-12-02 DIAGNOSIS — R80.9 PROTEINURIA: ICD-10-CM

## 2022-12-02 LAB
ALBUMIN MFR UR ELPH: 49.8 MG/DL
ALBUMIN SERPL BCG-MCNC: 4.8 G/DL (ref 3.5–5.2)
ALBUMIN UR-MCNC: 100 MG/DL
ANION GAP SERPL CALCULATED.3IONS-SCNC: 13 MMOL/L (ref 7–15)
APPEARANCE UR: CLEAR
BACTERIA #/AREA URNS HPF: ABNORMAL /HPF
BILIRUB UR QL STRIP: NEGATIVE
BUN SERPL-MCNC: 21.4 MG/DL (ref 6–20)
C PEPTIDE SERPL-MCNC: 1.3 NG/ML (ref 0.9–6.9)
CALCIUM SERPL-MCNC: 9.7 MG/DL (ref 8.6–10)
CHLORIDE SERPL-SCNC: 101 MMOL/L (ref 98–107)
CHOLEST SERPL-MCNC: 218 MG/DL
COLOR UR AUTO: YELLOW
CREAT SERPL-MCNC: 1.26 MG/DL (ref 0.67–1.17)
CREAT UR-MCNC: 121 MG/DL
CREAT UR-MCNC: 121 MG/DL
CRP SERPL-MCNC: 13 MG/L
DEPRECATED HCO3 PLAS-SCNC: 24 MMOL/L (ref 22–29)
GFR SERPL CREATININE-BSD FRML MDRD: 76 ML/MIN/1.73M2
GLUCOSE SERPL-MCNC: 104 MG/DL (ref 70–99)
GLUCOSE UR STRIP-MCNC: >=1000 MG/DL
HBA1C MFR BLD: 7.9 % (ref 0–5.6)
HDLC SERPL-MCNC: 41 MG/DL
HGB BLD-MCNC: 16.1 G/DL (ref 13.3–17.7)
HGB UR QL STRIP: ABNORMAL
KETONES UR STRIP-MCNC: NEGATIVE MG/DL
LDLC SERPL CALC-MCNC: 141 MG/DL
LEUKOCYTE ESTERASE UR QL STRIP: NEGATIVE
MICROALBUMIN UR-MCNC: 280 MG/L
MICROALBUMIN/CREAT UR: 231.4 MG/G CR (ref 0–17)
NITRATE UR QL: NEGATIVE
NONHDLC SERPL-MCNC: 177 MG/DL
PH UR STRIP: 6 [PH] (ref 5–7)
PHOSPHATE SERPL-MCNC: 3.1 MG/DL (ref 2.5–4.5)
POTASSIUM SERPL-SCNC: 4.5 MMOL/L (ref 3.4–5.3)
PROT/CREAT 24H UR: 0.41 MG/MG CR (ref 0–0.2)
RBC #/AREA URNS AUTO: ABNORMAL /HPF
SODIUM SERPL-SCNC: 138 MMOL/L (ref 136–145)
SP GR UR STRIP: 1.02 (ref 1–1.03)
TRIGL SERPL-MCNC: 180 MG/DL
UROBILINOGEN UR STRIP-ACNC: 0.2 E.U./DL
WBC #/AREA URNS AUTO: ABNORMAL /HPF

## 2022-12-02 PROCEDURE — 84681 ASSAY OF C-PEPTIDE: CPT

## 2022-12-02 PROCEDURE — 36415 COLL VENOUS BLD VENIPUNCTURE: CPT

## 2022-12-02 PROCEDURE — 86341 ISLET CELL ANTIBODY: CPT | Mod: 90

## 2022-12-02 PROCEDURE — 86038 ANTINUCLEAR ANTIBODIES: CPT

## 2022-12-02 PROCEDURE — 99000 SPECIMEN HANDLING OFFICE-LAB: CPT

## 2022-12-02 PROCEDURE — 86160 COMPLEMENT ANTIGEN: CPT

## 2022-12-02 PROCEDURE — 86225 DNA ANTIBODY NATIVE: CPT

## 2022-12-02 PROCEDURE — 81001 URINALYSIS AUTO W/SCOPE: CPT

## 2022-12-02 PROCEDURE — 86140 C-REACTIVE PROTEIN: CPT

## 2022-12-02 PROCEDURE — 86036 ANCA SCREEN EACH ANTIBODY: CPT

## 2022-12-02 PROCEDURE — 86160 COMPLEMENT ANTIGEN: CPT | Mod: 59

## 2022-12-02 PROCEDURE — 84156 ASSAY OF PROTEIN URINE: CPT

## 2022-12-02 PROCEDURE — 83036 HEMOGLOBIN GLYCOSYLATED A1C: CPT

## 2022-12-02 PROCEDURE — 86256 FLUORESCENT ANTIBODY TITER: CPT

## 2022-12-02 PROCEDURE — 82043 UR ALBUMIN QUANTITATIVE: CPT

## 2022-12-02 PROCEDURE — 85018 HEMOGLOBIN: CPT

## 2022-12-02 PROCEDURE — 80061 LIPID PANEL: CPT

## 2022-12-02 PROCEDURE — 80069 RENAL FUNCTION PANEL: CPT

## 2022-12-02 NOTE — PROGRESS NOTES
RE: Labs?  Received: Yesterday  Alonso Diop MD Barney, Brittney, RN  Would you check a regular CKD panel and the following:   - Kidney ultrasound   - Urinalysis   - UPCR   - VALORIE, dsDNA, ANCA, C3, C4, CRP     Thanks           Previous Messages     ----- Message -----   From: Maggie Christie, SIDRA   Sent: 11/23/2022   1:45 PM CST   To: Alonso Diop MD   Subject: Labs?                                             New patient being referred for proteinuria in [place]. I don't have a protocol on this one. Let me know what you would like for labs.     Maggie VALENTE

## 2022-12-05 ENCOUNTER — TELEPHONE (OUTPATIENT)
Dept: NEPHROLOGY | Facility: CLINIC | Age: 37
End: 2022-12-05
Payer: COMMERCIAL

## 2022-12-05 LAB
ANA SER QL IF: NEGATIVE
ANCA AB PATTERN SER IF-IMP: NORMAL
C-ANCA TITR SER IF: NORMAL {TITER}
C3 SERPL-MCNC: 179 MG/DL (ref 81–157)
C4 SERPL-MCNC: 29 MG/DL (ref 13–39)
DSDNA AB SER-ACNC: 0.8 IU/ML

## 2022-12-05 NOTE — TELEPHONE ENCOUNTER
[facility] Call Center    Phone Message    May a detailed message be left on voicemail: yes     Reason for Call: Other: Ginny from [facility] calling to have Dr. Diop sign off on the imaging order placed. Thank you     Action Taken: Message routed to:  [facility] Neph    Travel Screening: Not Applicable

## 2022-12-06 ENCOUNTER — ANCILLARY PROCEDURE (OUTPATIENT)
Dept: ULTRASOUND IMAGING | Facility: CLINIC | Age: 37
End: 2022-12-06
Attending: INTERNAL MEDICINE
Payer: COMMERCIAL

## 2022-12-06 ENCOUNTER — VIRTUAL VISIT (OUTPATIENT)
Dept: NEPHROLOGY | Facility: CLINIC | Age: 37
End: 2022-12-06
Attending: INTERNAL MEDICINE
Payer: COMMERCIAL

## 2022-12-06 DIAGNOSIS — E11.65 UNCONTROLLED TYPE 2 DIABETES MELLITUS WITH HYPERGLYCEMIA (H): ICD-10-CM

## 2022-12-06 DIAGNOSIS — R80.9 PROTEINURIA: ICD-10-CM

## 2022-12-06 LAB — GAD65 AB SER IA-ACNC: <5 IU/ML

## 2022-12-06 PROCEDURE — 76770 US EXAM ABDO BACK WALL COMP: CPT

## 2022-12-06 PROCEDURE — 99204 OFFICE O/P NEW MOD 45 MIN: CPT | Mod: GT | Performed by: INTERNAL MEDICINE

## 2022-12-06 NOTE — PATIENT INSTRUCTIONS
It was a pleasure taking care of you today.  I've included a brief summary of our discussion and care plan from today's visit below.  Please review this information with your primary care provider.    My recommendations are summarized as follows:  - Buy blood pressure cuff.  Start taking blood pressure daily, and write down the readings.  - Nurse visit in 2 weeks to review blood pressure readings.  - Increase lisinopril as blood pressure allows.  - Consider adding finerenone in the future if we need a second agent for blood pressure control.    - Return visit in 6 months with a CKD laboratory panel.    To schedule imaging please call [phone]. To schedule your lab appointment at [facility] 1st floor lab call [phone]    Who do I call with any questions after my visit?  Please be in touch if there are any further questions that arise following today's visit.  There are multiple ways to contact your nephrology care team.      During business hours, you may reach your Nephrology RN Care Coordinator, Maggie Christie at . To schedule or reschedule an appointment, please call [phone].    You can always send a secure message through SecondMic.  SecondMic messages are answered by your nurse or doctor typically within 24 hours.  Please allow extra time on weekends and holidays.      For urgent/emergent questions after business hours, you may reach the on-call Nephrology Fellow by contacting the [facility]  at [phone].     How will I get the results of any tests ordered?    You will receive all of your results.  If you have signed up for SecondMic, any tests ordered at your visit will be available to you after your physician reviews them.  Typically this takes 1-2 weeks.  If there are urgent results that require a change in your care plan, your physician or nurse will call you to discuss the next steps.      What is SecondMic?  SecondMic is a secure way for you to access all of  your healthcare records from the [facility].  It is a web based computer program, so you can sign on to it from any location.  It also allows you to send secure messages to your care team.  I recommend signing up for OM[employer] access if you have not already done so and are comfortable with using a computer.      How do I schedule a follow-up visit?  If you did not schedule a follow-up visit today, please call [phone] to schedule a follow-up office visit.      Sincerely,    Alonso Diop MD  [facility]  Division of Nephrology and Hypertension

## 2022-12-06 NOTE — PROGRESS NOTES
Nephrology Outpatient Consult Note    Requesting Provider  Anjali Forte MD  [facility] [place] [address]    Chief Complaint/Reason for Visit  Chronic kidney disease due to diabetic nephropathy    History of Present Illness  Shen is a 37 year old who is being evaluated via a billable video visit.  This is a 37-year-old male who was referred to our clinic for further work-up and management of chronic kidney disease.  His past medical history is notable for type 2 diabetes mellitus diagnosed back in 2013, hypertriglyceridemia, and chronic kidney disease.    With regards to his kidney function, we have laboratory data available through care everywhere.  Back in 2013, the patient's creatinine was 1.1 mg/dL.  More recently, his serum creatinine has been somewhat higher.  In addition, he has albuminuria.    His most recent laboratory evaluation was on December 2, 2022.  He had a renal panel that showed a creatinine of 1.2 mg/dL.  His serum electrolytes were fairly normal.  Albumin to creatinine ratio was 231 mg/g.  Hemoglobin was 16.  His urinalysis is notable for glucose in the urine, as well as proteinuria.  However, he has no pyuria, or hematuria.  He completed a kidney ultrasound today, which was normal.    The patient does not have any major health issues besides diabetes.  He states that he is doing fairly well from a health perspective.  He is under the care of Dr. Olivo from endocrinology, and his diabetes regimen currently includes insulin Lantus, metformin, dulaglutide, and dapagliflozin.  He is also lisinopril 5 g orally daily.  He does not check his blood pressure regularly.  He does not have a blood pressure cuff.    He has a family history of hypertension, and diabetes.  However, no kidney problems in the family.  No autoimmune disease in his parents.    He denies any chest pain or difficulty breathing.  He has no abdominal pain.  He has no dysuria or hematuria.  No lower extremity edema.   He denies fevers, chills, night sweats.  He denies joint pain, or skin rash.  He smokes tobacco occasionally.  He uses alcohol occasionally.    The following portions of the patient's history were reviewed and updated as appropriate: allergies, current medications, past family history, past medical history, past social history, past surgical history and problem list.    Subjective   Review of Systems  A comprehensive review of systems was performed. Pertinent positives and negatives are described above.    Past Medical/Surgical History  Patient  has a past medical history of Attention deficit disorder without mention of hyperactivity.  Patient  has no past surgical history on file.    Social History  Patient  reports that he has been smoking cigarettes and cigars. He has never used smokeless tobacco. He reports current alcohol use. He reports that he does not use drugs.    Family History  family history is not on file.    Objective   Vital Signs  There were no vitals taken for this visit. There is no height or weight on file to calculate BMI.    Physical Examination  Not performed as this is a virtual visit.    Lab Results   Component Value Date    WBC 11.5 (H) 06/16/2003    HGB 16.1 12/02/2022    HCT 42.9 06/16/2003    MCV 91 06/16/2003     06/16/2003     12/02/2022    BUN 21.4 (H) 12/02/2022    ANIONGAP 13 12/02/2022    ALBUMIN 4.8 12/02/2022     Lab Results   Component Value Date    UROBILINOGEN 0.2 12/02/2022     Current Outpatient Medications   Medication     Alcohol Swabs PADS     BD [employer] MISC     blood glucose (NO BRAND SPECIFIED) lancets standard     blood glucose (NO BRAND SPECIFIED) test strip     blood glucose (ONETOUCH VERIO IQ) test strip     Continuous Blood Gluc Sensor (DEXCOM G6 SENSOR) MISC     dapagliflozin (FARXIGA) 10 MG TABS tablet     Dulaglutide (TRULICITY) 3 MG/0.5ML SOPN     escitalopram (LEXAPRO) 10 MG tablet     fenofibrate (TRICOR) 145 MG tablet     fish  oil-omega-3 fatty acids 1000 MG capsule     insulin glargine (LANTUS PEN) 100 UNIT/ML pen     insulin pen needle (32G X 4 MM) 32G X 4 MM miscellaneous     lisinopril (ZESTRIL) 5 MG tablet     metFORMIN (GLUCOPHAGE XR) 500 MG 24 hr tablet     No current facility-administered medications for this visit.     Assessment & Plan   Diabetic nephropathy  Chronic kidney disease stage III A  Proteinuria  Hypertension  Type 2 diabetes mellitus    The patient has chronic kidney disease with proteinuria likely due to diabetic nephropathy.  He was diagnosed with diabetes back in 2013, and very well controlled previously.  His hemoglobin A1c was 11 last year.  He is doing better from a diabetes treatment perspective.  His most recent hemoglobin A1c was 7.9.    He does have proteinuria around 0.4 g per 24 hours.  He also has albuminuria.  These are due to diabetic nephropathy.  I have arranged for work-up including a kidney ultrasound which was normal.  Basic serological work-up including VALORIE, double-stranded DNA, complement level, and ANCA were negative.  His CRP is elevated but given the lack of symptoms, and a negative autoimmune work-up, I do not think additional evaluation is warranted at this point.    I counseled patient about the importance of good control of his diabetes.  I also advised him to stop smoking.  I advised him to stay away of NSAIDs.    With regards to medical regimen, I think we should try to maximize his lisinopril.  I instructed him to buy blood pressure cuff and to start checking his blood pressure at home.  His lisinopril dose to be increased as his blood pressure allows.  In the future, if he needs a second medication, then it would be reasonable to consider an aldosterone antagonist such as finerenone.  He is already on an SGL 2 inhibitor.    My recommendations are the following:  - Buy blood pressure cuff.  Start taking blood pressure daily, and write down the readings.  - Nurse visit in 2 weeks to  review blood pressure readings.  - Increase lisinopril as blood pressure allows.  - Consider adding finerenone in the future if we need a second agent for blood pressure control.    - Return visit in 6 months with a CKD laboratory panel.    Total time was of this encounter on the date of service was 50 minutes. All questions were answered to the patient's satisfaction.  Chart documentation was completed, in part, with [employer] voice-recognition software. Even though reviewed, some grammatical, spelling, and word errors may remain.    Telemedicine Visit Addendum:  Consultation provided at the request of above provider for advice regarding the diagnosis and treatment of patient's condition. The patient's condition can be safely assessed via telemedicine. Patient has agreed to receiving services via telemedicine technology. Date of service is 12/06/22. Time Service Began: 11:00 AM. Time Service Ended: 11:30 AM. Originating Location (pt. Location): Home. Distant Location (provider location):  [facility].Reason that telemedicine is appropriate: Patient unable to travel. Mode of transmission: Secure real time interactive audio and visual telecommunication system  [employer]        Orders placed today:  No orders of the defined types were placed in this encounter.

## 2022-12-19 NOTE — RESULT ENCOUNTER NOTE
Dear Shen,     Here are your recent results. Not consistent with type 1 diabetes, which is good. Your cholesterol continues to look better. Hope you're well, see you in February.    Please let us know if you have any questions or concerns.    Regards,  Anjali Forte MD

## 2023-02-21 ENCOUNTER — VIRTUAL VISIT (OUTPATIENT)
Dept: ENDOCRINOLOGY | Facility: CLINIC | Age: 38
End: 2023-02-21
Payer: COMMERCIAL

## 2023-02-21 ENCOUNTER — TELEPHONE (OUTPATIENT)
Dept: ENDOCRINOLOGY | Facility: CLINIC | Age: 38
End: 2023-02-21

## 2023-02-21 DIAGNOSIS — E11.65 UNCONTROLLED TYPE 2 DIABETES MELLITUS WITH HYPERGLYCEMIA (H): Primary | ICD-10-CM

## 2023-02-21 PROCEDURE — 99213 OFFICE O/P EST LOW 20 MIN: CPT | Mod: VID | Performed by: INTERNAL MEDICINE

## 2023-02-21 RX ORDER — ACYCLOVIR 400 MG/1
1 TABLET ORAL
Qty: 10 EACH | Refills: 3 | Status: SHIPPED | OUTPATIENT
Start: 2023-02-21 | End: 2023-10-17

## 2023-02-21 NOTE — TELEPHONE ENCOUNTER
PA Initiation    Medication: Dexcom G7  Insurance Company: [employer] - Phone [phone] Fax [phone]  Pharmacy Filling the Rx:    Filling Pharmacy Phone:    Filling Pharmacy Fax:    Start Date: 2/21/2023    Key: [id number]

## 2023-02-21 NOTE — PROGRESS NOTES
"  Video-Visit Details    Type of service:  Video Visit  Video Start Time: 09:38  Video End Time: 10:01  Originating Location (pt. Location): Home, [place]  Distant Location (provider location):  Home  Platform used for Video Visit: Ban Forte MD    Ed Murcia is a 37 year old year old male here for follow-up of diabetes mellitus, hypertriglyceridemia via a billable video visit.    Has PCP at [facility] (not available in CareEverywhere). He also has PMHx of ADHD and hypertriglyceridemia    INTERVAL HISTORY:  - Had COVID over ashley break  - Dexcom ripping out more frequently, hoping to switch to newer G7   - Feels that he is overall eating as healthy as he can, staying physically active and yet not making any progress with his blood glucoses  - Will see off/on low blood sugars overnight      1) Diabetes Mellitus-- Type 2    Diabetes History:  Diagnosis: 2013, noticed polyuria/polydypsia, had screen done and diagnosed with DM2, some weight loss minimal at time of diagnosis 12/2022- MURPHY <5.0, Cpeptide 1.3, Glucose 104  Hospitalizations: none  Previous Regimens: none  Current Regimen: Metformin 2000mg XR at night, Farxiga 10mg daily, Trulicity 3.0mg, Lantus 35u nightly      BG check- Dexcom  Trends-              Denies polyuria, polydypsia, overall feels very well    Complications: no retinopathy, recent screening August, does have nephropathy  Fam: aunt Paternal w/ DM    Last eye exam: Completed end August, no retinopathy \"eyes look better than before\"  Foot Exam: completed at previous visit  ACEI/ARB: not now, ZACHARY elevated x2, on SGLT-2  Statin/ASA: not currently taking, on fish oil      2) Hypertriglyceridemia  - Longstanding, currently on Fenofibrate between 8/2-->9/27- with improving levels (1049-->611)  - Taking fish oil once daily  Much improved! 180 (most recently 12/2022)      BP Readings from Last 3 Encounters:   05/11/22 (!) 138/95       1/20/2022-  A1C- 9.5%  CBC- wnl  CMP- wnl (glucose " 227)  Lipids- , HDL 38, , LDL (too high)  UA- No urine protein      No results found for: A1C      No results for input(s): CHOL, HDL, LDL, TRIG, CHOLHDLRATIO in the last 09478 hours.    No results found for: MICROL  No results found for: MICROALBUMIN      Wt Readings from Last 3 Encounters:   05/11/22 79.9 kg (176 lb 3.2 oz)       Current Outpatient Medications   Medication Sig Dispense Refill     Continuous Blood Gluc Sensor (DEXCOM G6 SENSOR) MISC Change every 10 days. 9 each 3     dapagliflozin (FARXIGA) 10 MG TABS tablet        Dulaglutide (TRULICITY) 3 MG/0.5ML SOPN Inject 3 mg Subcutaneous once a week 6 mL 3     escitalopram (LEXAPRO) 10 MG tablet        fenofibrate (TRICOR) 145 MG tablet Take 1 tablet (145 mg) by mouth daily 90 tablet 3     fish oil-omega-3 fatty acids 1000 MG capsule        insulin glargine (LANTUS PEN) 100 UNIT/ML pen Inject up to 35 units nightly as directed. 30 mL 3     lisinopril (ZESTRIL) 5 MG tablet Take 1 tablet (5 mg) by mouth daily 90 tablet 3     metFORMIN (GLUCOPHAGE XR) 500 MG 24 hr tablet Take 4 tablets (2,000 mg) by mouth daily (with dinner) 360 tablet 3     Alcohol Swabs PADS Use on skin prior to fingertip glucose testing 3x per day 100 each 11     BD [employer] MISC 1 each daily 1 each 0     blood glucose (NO BRAND SPECIFIED) lancets standard Use to test blood sugar 2 times daily or as directed. 100 each 3     blood glucose (NO BRAND SPECIFIED) test strip Use to test blood sugar 2 times daily or as directed. 100 strip 3     blood glucose (ONETOUCH VERIO IQ) test strip Use to test blood sugar up to 1 time daily. 50 strip 11     insulin pen needle (32G X 4 MM) 32G X 4 MM miscellaneous Use 1 pen needles daily or as directed. 90 each 3       Histories reviewed and updated in Epic.  Past Medical History:   Diagnosis Date     Attention deficit disorder without mention of hyperactivity        No past surgical history on file.    Allergies:   Seasonale    Social History     Tobacco Use     Smoking status: Some Days     Types: Cigarettes, Cigars     Smokeless tobacco: Never   Substance Use Topics     Alcohol use: Yes       No family history on file.       REVIEW OF SYSTEMS:   ROS: 10 point ROS neg other than the symptoms noted above in the HPI.      EXAM:  Vitals: There were no vitals taken for this visit.    BMIE= There is no height or weight on file to calculate BMI.    Physical Exam (visual exam)  VS:  no vital signs taken for video visit  CONSTITUTIONAL: healthy, alert and NAD, responding appropriately  ENT: normocephalic, no visual evidence of trauma, normal nose and oral mucosa  EYES: conjunctivae and sclerae normal, no exophthalmos or proptosis  THYROID:  no visualized nodules or goiter  LUNGS: no audible wheeze, cough or visible cyanosis, no visible retractions or increased work of breathing  EXTREMITIES: no hand tremors  NEUROLOGY: cranial nerves grossly intact with no obvious deficit.  SKIN:  no visualized skin lesions or rash, no edema visualized  PSYCH: mentation appears normal, normal judgement      ASSESSMENT/PLAN:  No orders of the defined types were placed in this encounter.      1) Diabetes Mellitus -- Improving, A1C 7.9%   - Glucose Control-    - Continue current dose basaglar 35u nightly, reviewed s/s of hypoglycemia an dtreatment of it   - Change Trulicty to mounjaro, if not covered will start Glipizide   - Continue metformin, Farxia, and dietary changes-- reduced metformin (to 1000mg daily) OK to try one week of 1000mg daily to see how he does.    - Continue Dexcom--> change to G7  - Referral to nephrology for management of sig proteinuia. Will retest, seeing some improvement and regression of retinopathy so if persistent can consider other etiologies. -- has follow-up in December      2) Hypertriglyceridemia   - On Fenofibrate, Fish Oil   - MUCH improved   - Consider statin at next visit    RTC- 3 months    A total of 23 minutes were  spent today 02/21/23 on this visit including chart review, history and counseling, documentation and other activities as detailed above.     Answers for HPI/ROS submitted by the patient on 2/14/2023  General Symptoms: No  Skin Symptoms: No  HENT Symptoms: No  EYE SYMPTOMS: No  HEART SYMPTOMS: No  LUNG SYMPTOMS: No  INTESTINAL SYMPTOMS: No  URINARY SYMPTOMS: No  REPRODUCTIVE SYMPTOMS: No  SKELETAL SYMPTOMS: No  BLOOD SYMPTOMS: No  NERVOUS SYSTEM SYMPTOMS: No  MENTAL HEALTH SYMPTOMS: No

## 2023-02-21 NOTE — LETTER
"    2/21/2023         RE: Ed Murcia  [address] [place] [address]        Dear Colleague,    Thank you for referring your patient, Ed Murcia, to the [facility] [place]. Please see a copy of my visit note below.      Video-Visit Details    Type of service:  Video Visit  Video Start Time: 09:38  Video End Time: 10:01  Originating Location (pt. Location): Home, [place]  Distant Location (provider location):  Home  Platform used for Video Visit: Ban Forte MD    Ed Murcia is a 37 year old year old male here for follow-up of diabetes mellitus, hypertriglyceridemia via a billable video visit.    Has PCP at [facility] (not available in [facility]). He also has PMHx of ADHD and hypertriglyceridemia    INTERVAL HISTORY:  - Had COVID over christmas break  - Dexcom ripping out more frequently, hoping to switch to newer G7   - Feels that he is overall eating as healthy as he can, staying physically active and yet not making any progress with his blood glucoses  - Will see off/on low blood sugars overnight      1) Diabetes Mellitus-- Type 2    Diabetes History:  Diagnosis: 2013, noticed polyuria/polydypsia, had screen done and diagnosed with DM2, some weight loss minimal at time of diagnosis 12/2022- MURPHY <5.0, Cpeptide 1.3, Glucose 104  Hospitalizations: none  Previous Regimens: none  Current Regimen: Metformin 2000mg XR at night, Farxiga 10mg daily, Trulicity 3.0mg, Lantus 35u nightly      BG check- Dexcom  Trends-              Denies polyuria, polydypsia, overall feels very well    Complications: no retinopathy, recent screening August, does have nephropathy  Fam: aunt Paternal w/ DM    Last eye exam: Completed end August, no retinopathy \"eyes look better than before\"  Foot Exam: completed at previous visit  ACEI/ARB: not now, ZACHARY elevated x2, on SGLT-2  Statin/ASA: not currently taking, on fish oil      2) Hypertriglyceridemia  - Longstanding, currently on " Fenofibrate between 8/2-->9/27- with improving levels (1049-->611)  - Taking fish oil once daily  Much improved! 180 (most recently 12/2022)      BP Readings from Last 3 Encounters:   05/11/22 (!) 138/95       1/20/2022-  A1C- 9.5%  CBC- wnl  CMP- wnl (glucose 227)  Lipids- , HDL 38, , LDL (too high)  UA- No urine protein      No results found for: A1C      No results for input(s): CHOL, HDL, LDL, TRIG, CHOLHDLRATIO in the last 12869 hours.    No results found for: MICROL  No results found for: MICROALBUMIN      Wt Readings from Last 3 Encounters:   05/11/22 79.9 kg (176 lb 3.2 oz)       Current Outpatient Medications   Medication Sig Dispense Refill     Continuous Blood Gluc Sensor (DEXCOM G6 SENSOR) MISC Change every 10 days. 9 each 3     dapagliflozin (FARXIGA) 10 MG TABS tablet        Dulaglutide (TRULICITY) 3 MG/0.5ML SOPN Inject 3 mg Subcutaneous once a week 6 mL 3     escitalopram (LEXAPRO) 10 MG tablet        fenofibrate (TRICOR) 145 MG tablet Take 1 tablet (145 mg) by mouth daily 90 tablet 3     fish oil-omega-3 fatty acids 1000 MG capsule        insulin glargine (LANTUS PEN) 100 UNIT/ML pen Inject up to 35 units nightly as directed. 30 mL 3     lisinopril (ZESTRIL) 5 MG tablet Take 1 tablet (5 mg) by mouth daily 90 tablet 3     metFORMIN (GLUCOPHAGE XR) 500 MG 24 hr tablet Take 4 tablets (2,000 mg) by mouth daily (with dinner) 360 tablet 3     Alcohol Swabs PADS Use on skin prior to fingertip glucose testing 3x per day 100 each 11     BD [employer] MISC 1 each daily 1 each 0     blood glucose (NO BRAND SPECIFIED) lancets standard Use to test blood sugar 2 times daily or as directed. 100 each 3     blood glucose (NO BRAND SPECIFIED) test strip Use to test blood sugar 2 times daily or as directed. 100 strip 3     blood glucose (ONETOUCH VERIO IQ) test strip Use to test blood sugar up to 1 time daily. 50 strip 11     insulin pen needle (32G X 4 MM) 32G X 4 MM miscellaneous Use 1 pen  needles daily or as directed. 90 each 3       Histories reviewed and updated in Epic.  Past Medical History:   Diagnosis Date     Attention deficit disorder without mention of hyperactivity        No past surgical history on file.    Allergies:  Seasonale    Social History     Tobacco Use     Smoking status: Some Days     Types: Cigarettes, Cigars     Smokeless tobacco: Never   Substance Use Topics     Alcohol use: Yes       No family history on file.       REVIEW OF SYSTEMS:   ROS: 10 point ROS neg other than the symptoms noted above in the HPI.      EXAM:  Vitals: There were no vitals taken for this visit.    BMIE= There is no height or weight on file to calculate BMI.    Physical Exam (visual exam)  VS:  no vital signs taken for video visit  CONSTITUTIONAL: healthy, alert and NAD, responding appropriately  ENT: normocephalic, no visual evidence of trauma, normal nose and oral mucosa  EYES: conjunctivae and sclerae normal, no exophthalmos or proptosis  THYROID:  no visualized nodules or goiter  LUNGS: no audible wheeze, cough or visible cyanosis, no visible retractions or increased work of breathing  EXTREMITIES: no hand tremors  NEUROLOGY: cranial nerves grossly intact with no obvious deficit.  SKIN:  no visualized skin lesions or rash, no edema visualized  PSYCH: mentation appears normal, normal judgement      ASSESSMENT/PLAN:  No orders of the defined types were placed in this encounter.      1) Diabetes Mellitus -- Improving, A1C 7.9%   - Glucose Control-    - Continue current dose basaglar 35u nightly, reviewed s/s of hypoglycemia an dtreatment of it   - Change Trulicty to mounjaro, if not covered will start Glipizide   - Continue metformin, Farxia, and dietary changes-- reduced metformin (to 1000mg daily) OK to try one week of 1000mg daily to see how he does.    - Continue Dexcom--> change to G7  - Referral to nephrology for management of sig proteinuia. Will retest, seeing some improvement and regression of  retinopathy so if persistent can consider other etiologies. -- has follow-up in December      2) Hypertriglyceridemia   - On Fenofibrate, Fish Oil   - MUCH improved   - Consider statin at next visit    RTC- 3 months    A total of 23 minutes were spent today 02/21/23 on this visit including chart review, history and counseling, documentation and other activities as detailed above.     Answers for HPI/ROS submitted by the patient on 2/14/2023  General Symptoms: No  Skin Symptoms: No  HENT Symptoms: No  EYE SYMPTOMS: No  HEART SYMPTOMS: No  LUNG SYMPTOMS: No  INTESTINAL SYMPTOMS: No  URINARY SYMPTOMS: No  REPRODUCTIVE SYMPTOMS: No  SKELETAL SYMPTOMS: No  BLOOD SYMPTOMS: No  NERVOUS SYSTEM SYMPTOMS: No  MENTAL HEALTH SYMPTOMS: No          Again, thank you for allowing me to participate in the care of your patient.        Sincerely,        Anjali Forte MD

## 2023-02-22 ENCOUNTER — TELEPHONE (OUTPATIENT)
Dept: ENDOCRINOLOGY | Facility: CLINIC | Age: 38
End: 2023-02-22
Payer: COMMERCIAL

## 2023-02-22 NOTE — TELEPHONE ENCOUNTER
When scheduling a follow-up with Dr. Forte, pt was wondering if there was an order put in yet for the New Dexcom  G7?  He is hoping someone can call him once it has been placed.

## 2023-02-22 NOTE — TELEPHONE ENCOUNTER
Prior Authorization Approval    Authorization Effective Date: 2/22/2023  Authorization Expiration Date: 2/22/2024  Medication: Dexcom G7  Approved Dose/Quantity: 3 per 30 days  Reference #: Key: [id number]   Insurance Company: [employer] - Phone [phone] Fax [phone]  Expected CoPay: $0     CoPay Card Available:      Foundation Assistance Needed:    Which Pharmacy is filling the prescription (Not needed for infusion/clinic administered): [facility] [address] [place] [address], [place] - [address] [place] [address]  Pharmacy Notified: Yes  Patient Notified: Yes    Pharmacy will notify pt once received

## 2023-04-15 ENCOUNTER — HEALTH MAINTENANCE LETTER (OUTPATIENT)
Age: 38
End: 2023-04-15

## 2023-04-27 ENCOUNTER — DOCUMENTATION ONLY (OUTPATIENT)
Dept: LAB | Facility: CLINIC | Age: 38
End: 2023-04-27
Payer: COMMERCIAL

## 2023-04-27 NOTE — PROGRESS NOTES
Ed Murcia has an upcoming lab appointment:    Future Appointments   Date Time Provider Department Center   5/1/2023 11:45 AM [facility]   6/13/2023 11:00 AM Anjali Forte MD [facility]      Patient is scheduled for the following lab(s): A1C    There is no order available. Please review and place either future orders or HMPO (Review of Health Maintenance Protocol Orders), as appropriate.    Health Maintenance Due   Topic     ANNUAL REVIEW OF HM ORDERS      HIV SCREENING      HEPATITIS C SCREENING      A1C      Mona Martinez

## 2023-08-09 DIAGNOSIS — E11.65 UNCONTROLLED TYPE 2 DIABETES MELLITUS WITH HYPERGLYCEMIA (H): ICD-10-CM

## 2023-08-09 NOTE — TELEPHONE ENCOUNTER
Requested Prescriptions   Pending Prescriptions Disp Refills    metFORMIN (GLUCOPHAGE XR) 500 MG 24 hr tablet 360 tablet 3     Sig: Take 4 tablets (2,000 mg) by mouth daily (with dinner)       There is no refill protocol information for this order          Last Written Prescription Date:  07/12/2022  Last Fill Quantity: 360 tablet,  # refills: 3   Last office visit: 5/11/2022 ; last virtual visit: 2/21/2023 with prescribing provider: Anjali Forte MD     Future Office Visit:  none

## 2023-08-10 RX ORDER — METFORMIN HCL 500 MG
2000 TABLET, EXTENDED RELEASE 24 HR ORAL
Qty: 360 TABLET | Refills: 0 | Status: SHIPPED | OUTPATIENT
Start: 2023-08-10 | End: 2023-10-17

## 2023-08-10 NOTE — TELEPHONE ENCOUNTER
Per LOV with Dr Forte on 2/21/23      Sent [employer] message to patient to confirm which metformin dose he is taking prior to refill.    Ranjit Womack RN

## 2023-08-10 NOTE — TELEPHONE ENCOUNTER
Refills sent  Will respond in mychart msg and also let pt know he is due for visit. Deyanira Delacruz RN

## 2023-09-16 ENCOUNTER — HEALTH MAINTENANCE LETTER (OUTPATIENT)
Age: 38
End: 2023-09-16

## 2023-09-28 DIAGNOSIS — E11.65 UNCONTROLLED TYPE 2 DIABETES MELLITUS WITH HYPERGLYCEMIA (H): ICD-10-CM

## 2023-09-28 DIAGNOSIS — E78.1 HYPERTRIGLYCERIDEMIA: ICD-10-CM

## 2023-09-28 NOTE — TELEPHONE ENCOUNTER
No mention in recent OF on 2/21/23 and 11/1/22 to continue lisinopril. Routing to Dr Forte to review refill.    Ranjit Womack RN    
Requested Prescriptions   Pending Prescriptions Disp Refills    lisinopril (ZESTRIL) 5 MG tablet 90 tablet 3     Sig: Take 1 tablet (5 mg) by mouth daily       There is no refill protocol information for this order        Last Written Prescription Date:  10/04/2022  Last Fill Quantity: 90 tablet,  # refills: 3   Last office visit: 5/11/2022 ; last virtual visit: 2/21/2023 with prescribing provider:  Dr. Reanna Alba   Future Office Visit:  11/21/2023    Bowen Gutierrez MA            
No

## 2023-10-02 RX ORDER — LISINOPRIL 5 MG/1
5 TABLET ORAL DAILY
Qty: 90 TABLET | Refills: 3 | Status: SHIPPED | OUTPATIENT
Start: 2023-10-02

## 2023-10-17 ENCOUNTER — VIRTUAL VISIT (OUTPATIENT)
Dept: ENDOCRINOLOGY | Facility: CLINIC | Age: 38
End: 2023-10-17
Payer: COMMERCIAL

## 2023-10-17 DIAGNOSIS — N18.2 CONTROLLED TYPE 2 DIABETES MELLITUS WITH STAGE 2 CHRONIC KIDNEY DISEASE, WITH LONG-TERM CURRENT USE OF INSULIN (H): Primary | ICD-10-CM

## 2023-10-17 DIAGNOSIS — E11.22 CONTROLLED TYPE 2 DIABETES MELLITUS WITH STAGE 2 CHRONIC KIDNEY DISEASE, WITH LONG-TERM CURRENT USE OF INSULIN (H): Primary | ICD-10-CM

## 2023-10-17 DIAGNOSIS — Z79.4 CONTROLLED TYPE 2 DIABETES MELLITUS WITH STAGE 2 CHRONIC KIDNEY DISEASE, WITH LONG-TERM CURRENT USE OF INSULIN (H): Primary | ICD-10-CM

## 2023-10-17 PROCEDURE — 99213 OFFICE O/P EST LOW 20 MIN: CPT | Mod: VID | Performed by: INTERNAL MEDICINE

## 2023-10-17 RX ORDER — DULAGLUTIDE 4.5 MG/.5ML
4.5 INJECTION, SOLUTION SUBCUTANEOUS WEEKLY
Qty: 6 ML | Refills: 3 | Status: SHIPPED | OUTPATIENT
Start: 2023-10-17

## 2023-10-17 RX ORDER — METFORMIN HCL 500 MG
1000 TABLET, EXTENDED RELEASE 24 HR ORAL
Qty: 180 TABLET | Refills: 3 | Status: SHIPPED | OUTPATIENT
Start: 2023-10-17

## 2023-10-17 RX ORDER — DAPAGLIFLOZIN 10 MG/1
10 TABLET, FILM COATED ORAL DAILY
Qty: 90 TABLET | Refills: 3 | Status: SHIPPED | OUTPATIENT
Start: 2023-10-17 | End: 2024-07-16

## 2023-10-17 RX ORDER — ACYCLOVIR 400 MG/1
1 TABLET ORAL
Qty: 9 EACH | Refills: 3 | Status: SHIPPED | OUTPATIENT
Start: 2023-10-17

## 2023-10-17 ASSESSMENT — PAIN SCALES - GENERAL: PAINLEVEL: NO PAIN (0)

## 2023-10-17 NOTE — PROGRESS NOTES
"  Video-Visit Details    Type of service:  Video Visit  Video Start Time: 11:34  Video End Time: 11:53  Originating Location (pt. Location): [place], [place]  Distant Location (provider location):  Home  Platform used for Video Visit: Ban Forte MD    Ed Murcia is a 38 year old year old male here for follow-up of diabetes mellitus, hypertriglyceridemia via a billable video visit.    Has PCP at [facility] (not available in CareEverywhere). He also has PMHx of ADHD and hypertriglyceridemia    INTERVAL HISTORY:  - recovering from bronchitis  - Occasional pressure lows, but otherwise doing well  - Recent labs done with PCP-- new kingdown A1C 5.8%  - [place] G7, feels it's much better G6  - Very happy with results, never thought he'd get to this point      1) Diabetes Mellitus-- Type 2    Diabetes History:  Diagnosis: 2013, noticed polyuria/polydypsia, had screen done and diagnosed with DM2, some weight loss minimal at time of diagnosis 12/2022- MURPHY <5.0, Cpeptide 1.3, Glucose 104  Hospitalizations: none  Previous Regimens: none  Current Regimen: Metformin 2000mg XR at night, Farxiga 10mg daily, Trulicity 3.0mg, Lantus 35u nightly      BG check- Dexcom          Trends-        Denies polyuria, polydypsia, overall feels very well    Complications: no retinopathy, recent screening August, does have nephropathy  Fam: aunt Paternal w/ DM    Last eye exam: Completed end August, no retinopathy \"eyes look better than before\"  Foot Exam: completed at previous visit  ACEI/ARB: not now, ZACHARY elevated x2, on SGLT-2  Statin/ASA: not currently taking, on fish oil      2) Hypertriglyceridemia  - Longstanding, currently on Fenofibrate between 8/2-->9/27- with improving levels (1049-->611)  - Taking fish oil once daily  Much improved! 180 (most recently 12/2022)  Will send updated labs from [facility]    BP Readings from Last 3 Encounters:   05/11/22 (!) 138/95       1/20/2022-  A1C- 9.5%  CBC- wnl  CMP- wnl (glucose " 227)  Lipids- , HDL 38, , LDL (too high)  UA- No urine protein      No results found for: A1C      No results for input(s): CHOL, HDL, LDL, TRIG, CHOLHDLRATIO in the last 00801 hours.    No results found for: MICROL  No results found for: MICROALBUMIN      Wt Readings from Last 3 Encounters:   05/11/22 79.9 kg (176 lb 3.2 oz)       Current Outpatient Medications   Medication Sig Dispense Refill    Alcohol Swabs PADS Use on skin prior to fingertip glucose testing 3x per day 100 each 11    BD [employer] MISC 1 each daily 1 each 0    blood glucose (NO BRAND SPECIFIED) lancets standard Use to test blood sugar 2 times daily or as directed. 100 each 3    blood glucose (NO BRAND SPECIFIED) test strip Use to test blood sugar 2 times daily or as directed. 100 strip 3    blood glucose (ONETOUCH VERIO IQ) test strip Use to test blood sugar up to 1 time daily. 50 strip 11    Continuous Blood Gluc Sensor (DEXCOM G7 SENSOR) MISC 1 each every 10 days 10 each 3    dapagliflozin (FARXIGA) 10 MG TABS tablet       Dulaglutide (TRULICITY) 3 MG/0.5ML SOPN Inject 3 mg Subcutaneous once a week 6 mL 3    escitalopram (LEXAPRO) 10 MG tablet       fenofibrate (TRICOR) 145 MG tablet Take 1 tablet (145 mg) by mouth daily 90 tablet 3    fish oil-omega-3 fatty acids 1000 MG capsule       insulin glargine (LANTUS PEN) 100 UNIT/ML pen Inject up to 35 units nightly as directed. 30 mL 3    insulin pen needle (31G X 5 MM) 31G X 5 MM miscellaneous Use 90 pen needles daily or as directed. 90 each 3    lisinopril (ZESTRIL) 5 MG tablet Take 1 tablet (5 mg) by mouth daily 90 tablet 3    metFORMIN (GLUCOPHAGE XR) 500 MG 24 hr tablet Take 4 tablets (2,000 mg) by mouth daily (with dinner) 360 tablet 0       Histories reviewed and updated in Epic.  Past Medical History:   Diagnosis Date    Attention deficit disorder without mention of hyperactivity        No past surgical history on file.    Allergies:  Seasonale    Social History      Tobacco Use    Smoking status: Some Days     Types: Cigarettes, Cigars    Smokeless tobacco: Never   Substance Use Topics    Alcohol use: Yes       No family history on file.       REVIEW OF SYSTEMS:   ROS: 10 point ROS neg other than the symptoms noted above in the HPI.      EXAM:  Vitals: There were no vitals taken for this visit.    BMIE= There is no height or weight on file to calculate BMI.    Physical Exam (visual exam)  VS:  no vital signs taken for video visit  CONSTITUTIONAL: healthy, alert and NAD, responding appropriately  ENT: normocephalic, no visual evidence of trauma, normal nose and oral mucosa  EYES: conjunctivae and sclerae normal, no exophthalmos or proptosis  THYROID:  no visualized nodules or goiter  LUNGS: no audible wheeze, cough or visible cyanosis, no visible retractions or increased work of breathing  EXTREMITIES: no hand tremors  NEUROLOGY: cranial nerves grossly intact with no obvious deficit.  SKIN:  no visualized skin lesions or rash, no edema visualized  PSYCH: mentation appears normal, normal judgement      ASSESSMENT/PLAN:  No orders of the defined types were placed in this encounter.      1) Diabetes Mellitus -- Improving, A1C 7.9% >5.8%!!  - Glucose Control-    - REDUCE metformin 1000mg ER nightly (patient preference, reduce pill burden)   - If BG ok after 2 weeks, lower Lantus to 30u at night (currently at 35). Goal fasting blood glucose <130   - INCREASE Trulicity to 4.5mg weekly   - Continue farxiga 10mg daily   - Continue dexcom g7  - Referral to nephrology for management of sig proteinuia. Will retest, seeing some improvement and regression of retinopathy so if persistent can consider other etiologies. -- has follow-up in December  - Continue ACE/SGLT2 for now for renal protection, need updated ZACHARY  - He will send labs over from [facility]    2) Hypertriglyceridemia   - On Fenofibrate, Fish Oil   - MUCH improved   - Consider statin at next visit    RTC- 6 months    A  total of 23 minutes were spent today 10/17/23 on this visit including chart review, history and counseling, documentation and other activities as detailed above.

## 2023-10-17 NOTE — LETTER
"    10/17/2023         RE: Ed Murcia  [address] [place] [address]        Dear Colleague,    Thank you for referring your patient, Ed Murcia, to the [facility] [place]. Please see a copy of my visit note below.      Video-Visit Details    Type of service:  Video Visit  Video Start Time: 11:34  Video End Time: 11:53  Originating Location (pt. Location): [place], [place]  Distant Location (provider location):  Home  Platform used for Video Visit: Ban Forte MD    Ed Murcia is a 38 year old year old male here for follow-up of diabetes mellitus, hypertriglyceridemia via a billable video visit.    [facility] Care[facility]where[facility] He also has PMHx of ADHD and hypertriglyceridemia    INTERVAL HISTORY:  - recovering from bronchitis  - Occasional pressure lows, but otherwise doing well  - Recent labs done with PCP-- new [place] A1C 5.8%  - [place] G7, feels it's much better G6  - Very happy with results, never thought he'd get to this point      1) Diabetes Mellitus-- Type 2    Diabetes History:  Diagnosis: 2013, noticed polyuria/polydypsia, had screen done and diagnosed with DM2, some weight loss minimal at time of diagnosis 12/2022- MURPHY <5.0, Cpeptide 1.3, Glucose 104  Hospitalizations: none  Previous Regimens: none  Current Regimen: Metformin 2000mg XR at night, Farxiga 10mg daily, Trulicity 3.0mg, Lantus 35u nightly      BG check- Dexcom          Trends-        Denies polyuria, polydypsia, overall feels very well    Complications: no retinopathy, recent screening August, does have nephropathy  Fam: aunt Paternal w/ DM    Last eye exam: Completed end August, no retinopathy \"eyes look better than before\"  Foot Exam: completed at previous visit  ACEI/ARB: not now, ZACHARY elevated x2, on SGLT-2  Statin/ASA: not currently taking, on fish oil      2) Hypertriglyceridemia  - Longstanding, currently on Fenofibrate between 8/2-->9/27- with improving " levels (1049-->611)  - Taking fish oil once daily  Much improved! 180 (most recently 12/2022)  Will send updated labs from [facility]    BP Readings from Last 3 Encounters:   05/11/22 (!) 138/95       1/20/2022-  A1C- 9.5%  CBC- wnl  CMP- wnl (glucose 227)  Lipids- , HDL 38, , LDL (too high)  UA- No urine protein      No results found for: A1C      No results for input(s): CHOL, HDL, LDL, TRIG, CHOLHDLRATIO in the last 26249 hours.    No results found for: MICROL  No results found for: MICROALBUMIN      Wt Readings from Last 3 Encounters:   05/11/22 79.9 kg (176 lb 3.2 oz)       Current Outpatient Medications   Medication Sig Dispense Refill     Alcohol Swabs PADS Use on skin prior to fingertip glucose testing 3x per day 100 each 11     BD [employer] MISC 1 each daily 1 each 0     blood glucose (NO BRAND SPECIFIED) lancets standard Use to test blood sugar 2 times daily or as directed. 100 each 3     blood glucose (NO BRAND SPECIFIED) test strip Use to test blood sugar 2 times daily or as directed. 100 strip 3     blood glucose (ONETOUCH VERIO IQ) test strip Use to test blood sugar up to 1 time daily. 50 strip 11     Continuous Blood Gluc Sensor (DEXCOM G7 SENSOR) MISC 1 each every 10 days 10 each 3     dapagliflozin (FARXIGA) 10 MG TABS tablet        Dulaglutide (TRULICITY) 3 MG/0.5ML SOPN Inject 3 mg Subcutaneous once a week 6 mL 3     escitalopram (LEXAPRO) 10 MG tablet        fenofibrate (TRICOR) 145 MG tablet Take 1 tablet (145 mg) by mouth daily 90 tablet 3     fish oil-omega-3 fatty acids 1000 MG capsule        insulin glargine (LANTUS PEN) 100 UNIT/ML pen Inject up to 35 units nightly as directed. 30 mL 3     insulin pen needle (31G X 5 MM) 31G X 5 MM miscellaneous Use 90 pen needles daily or as directed. 90 each 3     lisinopril (ZESTRIL) 5 MG tablet Take 1 tablet (5 mg) by mouth daily 90 tablet 3     metFORMIN (GLUCOPHAGE XR) 500 MG 24 hr tablet Take 4 tablets (2,000 mg) by mouth  daily (with dinner) 360 tablet 0       Histories reviewed and updated in Epic.  Past Medical History:   Diagnosis Date     Attention deficit disorder without mention of hyperactivity        No past surgical history on file.    Allergies:  Seasonale    Social History     Tobacco Use     Smoking status: Some Days     Types: Cigarettes, Cigars     Smokeless tobacco: Never   Substance Use Topics     Alcohol use: Yes       No family history on file.       REVIEW OF SYSTEMS:   ROS: 10 point ROS neg other than the symptoms noted above in the HPI.      EXAM:  Vitals: There were no vitals taken for this visit.    BMIE= There is no height or weight on file to calculate BMI.    Physical Exam (visual exam)  VS:  no vital signs taken for video visit  CONSTITUTIONAL: healthy, alert and NAD, responding appropriately  ENT: normocephalic, no visual evidence of trauma, normal nose and oral mucosa  EYES: conjunctivae and sclerae normal, no exophthalmos or proptosis  THYROID:  no visualized nodules or goiter  LUNGS: no audible wheeze, cough or visible cyanosis, no visible retractions or increased work of breathing  EXTREMITIES: no hand tremors  NEUROLOGY: cranial nerves grossly intact with no obvious deficit.  SKIN:  no visualized skin lesions or rash, no edema visualized  PSYCH: mentation appears normal, normal judgement      ASSESSMENT/PLAN:  No orders of the defined types were placed in this encounter.      1) Diabetes Mellitus -- Improving, A1C 7.9% >5.8%!!  - Glucose Control-    - REDUCE metformin 1000mg ER nightly (patient preference, reduce pill burden)   - If BG ok after 2 weeks, lower Lantus to 30u at night (currently at 35). Goal fasting blood glucose <130   - INCREASE Trulicity to 4.5mg weekly   - Continue farxiga 10mg daily   - Continue dexcom g7  - Referral to nephrology for management of sig proteinuia. Will retest, seeing some improvement and regression of retinopathy so if persistent can consider other etiologies. --  has follow-up in December  - Continue ACE/SGLT2 for now for renal protection, need updated ZACHARY  - He will send labs over from [facility]    2) Hypertriglyceridemia   - On Fenofibrate, Fish Oil   - MUCH improved   - Consider statin at next visit    RTC- 6 months    A total of 23 minutes were spent today 10/17/23 on this visit including chart review, history and counseling, documentation and other activities as detailed above.         Again, thank you for allowing me to participate in the care of your patient.        Sincerely,        Anjali Forte MD

## 2023-10-17 NOTE — PATIENT INSTRUCTIONS
Glucose goals according to the American Diabetes Association:  --Pre-meal (including fasting, before meals):  mg/dl  --2 hours after eating: <180 mg/dl, ideally 100-150 mg/dl      1) Reduce metformin to 1000mg (2 tab) nightly)  2) Monitor BG for 2 weeks, if still meeting above goals, reduce lantus to 30u nightly  3) Increase trulicity to 4.5mg weekly (I left the 3.0mg RX in case you have a hard time getting this)  4) Can continue to reduce lantus by 5 every other week as long as you stay in above goal range

## 2023-10-18 ENCOUNTER — MYC MEDICAL ADVICE (OUTPATIENT)
Dept: ENDOCRINOLOGY | Facility: CLINIC | Age: 38
End: 2023-10-18
Payer: COMMERCIAL

## 2023-10-18 ENCOUNTER — TELEPHONE (OUTPATIENT)
Dept: ENDOCRINOLOGY | Facility: CLINIC | Age: 38
End: 2023-10-18
Payer: COMMERCIAL

## 2023-10-18 DIAGNOSIS — E11.65 UNCONTROLLED TYPE 2 DIABETES MELLITUS WITH HYPERGLYCEMIA (H): ICD-10-CM

## 2023-10-18 RX ORDER — DULAGLUTIDE 3 MG/.5ML
3 INJECTION, SOLUTION SUBCUTANEOUS WEEKLY
Qty: 6 ML | Refills: 3 | OUTPATIENT
Start: 2023-10-18

## 2023-10-18 NOTE — TELEPHONE ENCOUNTER
Requested Prescriptions   Pending Prescriptions Disp Refills    Dulaglutide (TRULICITY) 3 MG/0.5ML SOPN 6 mL 3     Sig: Inject 3 mg Subcutaneous once a week       There is no refill protocol information for this order            Last Written Prescription Date:  11/1/2022  Last Fill Quantity: 6ml,  # refills: 3   Last office visit: 5/11/2022 ; last virtual visit: 10/17/2023 with prescribing provider:    Anjali Forte MD    Future Office Visit: None

## 2023-11-06 DIAGNOSIS — E78.1 HYPERTRIGLYCERIDEMIA: ICD-10-CM

## 2023-11-06 DIAGNOSIS — E11.65 UNCONTROLLED TYPE 2 DIABETES MELLITUS WITH HYPERGLYCEMIA (H): ICD-10-CM

## 2023-11-06 NOTE — TELEPHONE ENCOUNTER
"Requested Prescriptions   Pending Prescriptions Disp Refills    insulin glargine (LANTUS PEN) 100 UNIT/ML pen 30 mL 3     Sig: Inject up to 35 units nightly as directed.       Long Acting Insulin Protocol Failed - 11/6/2023 10:55 AM        Failed - HgbA1C in past 3 or 6 months     If HgbA1C is 8 or greater, it needs to be on file within the past 3 months.  If less than 8, must be on file within the past 6 months.     Recent Labs   Lab Test 12/02/22  0930 08/02/22  1018 08/19/21  1200   A1C 7.9*   < >  --    54864  --   --  11.0*    < > = values in this interval not displayed.             Passed - Serum creatinine on file in past 12 months     Recent Labs   Lab Test 12/02/22  0930   CR 1.26*       Ok to refill medication if creatinine is low          Passed - Medication is active on med list        Passed - Patient is age 18 or older        Passed - Recent (6 mo) or future (30 days) visit within the authorizing provider's specialty     Patient had office visit in the last 6 months or has a visit in the next 30 days with authorizing provider or within the authorizing provider's specialty.  See \"Patient Info\" tab in inbasket, or \"Choose Columns\" in Meds & Orders section of the refill encounter.               Refills sent  Deyanira Delacruz RN    "

## 2023-11-06 NOTE — TELEPHONE ENCOUNTER
Requested Prescriptions   Pending Prescriptions Disp Refills    insulin glargine (LANTUS PEN) 100 UNIT/ML pen 30 mL 3     Sig: Inject up to 35 units nightly as directed.       There is no refill protocol information for this order            Last Written Prescription Date:  11/1/2022  Last Fill Quantity: 30 mL,  # refills: 3   Last office visit: 5/11/2022 ; last virtual visit: 10/17/2023 with prescribing provider: Anjali Forte MD   Future Office Visit: 5/28/2024

## 2023-12-05 ENCOUNTER — VIRTUAL VISIT (OUTPATIENT)
Dept: ENDOCRINOLOGY | Facility: CLINIC | Age: 38
End: 2023-12-05
Payer: COMMERCIAL

## 2023-12-05 DIAGNOSIS — N18.2 CONTROLLED TYPE 2 DIABETES MELLITUS WITH STAGE 2 CHRONIC KIDNEY DISEASE, WITH LONG-TERM CURRENT USE OF INSULIN (H): Primary | ICD-10-CM

## 2023-12-05 DIAGNOSIS — E78.1 HYPERTRIGLYCERIDEMIA: ICD-10-CM

## 2023-12-05 DIAGNOSIS — E11.22 CONTROLLED TYPE 2 DIABETES MELLITUS WITH STAGE 2 CHRONIC KIDNEY DISEASE, WITH LONG-TERM CURRENT USE OF INSULIN (H): Primary | ICD-10-CM

## 2023-12-05 DIAGNOSIS — Z79.4 CONTROLLED TYPE 2 DIABETES MELLITUS WITH STAGE 2 CHRONIC KIDNEY DISEASE, WITH LONG-TERM CURRENT USE OF INSULIN (H): Primary | ICD-10-CM

## 2023-12-05 PROCEDURE — 99213 OFFICE O/P EST LOW 20 MIN: CPT | Mod: VID | Performed by: INTERNAL MEDICINE

## 2023-12-05 NOTE — PROGRESS NOTES
"  Video-Visit Details    Type of service:  Video Visit  Video Start Time: 10:32  Video End Time: 10:42  Originating Location (pt. Location): [place], [place]  Distant Location (provider location):  Home  Platform used for Video Visit: Ban Forte MD    Ed Murcia is a 38 year old year old male here for follow-up of diabetes mellitus, hypertriglyceridemia via a billable video visit.    Has PCP at [facility] (not available in CareEverywhere). He also has PMHx of ADHD and hypertriglyceridemia    INTERVAL HISTORY:  - recovering from bronchitis  - Overall BG significantly higher last two weeks, attributes to Thanksgiving, not worried about the numbers rising and wants to leave everything as is  - Last A1C 5.8% at PCP in October 1) Diabetes Mellitus-- Type 2    Diabetes History:  Diagnosis: 2013, noticed polyuria/polydypsia, had screen done and diagnosed with DM2, some weight loss minimal at time of diagnosis 12/2022- MURPHY <5.0, Cpeptide 1.3, Glucose 104  Hospitalizations: none  Previous Regimens: none  Current Regimen: Metformin 2000mg XR at night, Farxiga 10mg daily, Trulicity 4.5mg, Lantus 35u nightly      BG check- Dexcom            Denies polyuria, polydypsia, overall feels very well    Complications: no retinopathy, recent screening August, does have nephropathy  Fam: aunt Paternal w/ DM    Last eye exam: Completed Dec 2023, no retinopathy \"eyes look better than before\" everything looks good  Foot Exam: completed at previous visit  ACEI/ARB: not now, ZACHARY elevated x2, on SGLT-2  Statin/ASA: not currently taking, on fish oil      2) Hypertriglyceridemia  - Longstanding, currently on Fenofibrate between 8/2-->9/27- with improving levels (1049-->611)  - Taking fish oil once daily  Much improved! 180 (most recently 12/2022)  Was unable to send updated labs from [facility], will get new ones prior to next visit    BP Readings from Last 3 Encounters:   05/11/22 (!) 138/95       1/20/2022-  A1C- 9.5%  CBC- " wnl  CMP- wnl (glucose 227)  Lipids- , HDL 38, , LDL (too high)  UA- No urine protein      No results found for: A1C      No results for input(s): CHOL, HDL, LDL, TRIG, CHOLHDLRATIO in the last 01942 hours.    No results found for: MICROL  No results found for: MICROALBUMIN      Wt Readings from Last 3 Encounters:   05/11/22 79.9 kg (176 lb 3.2 oz)       Current Outpatient Medications   Medication Sig Dispense Refill    Alcohol Swabs PADS Use on skin prior to fingertip glucose testing 3x per day 100 each 11    BD [employer] MISC 1 each daily 1 each 0    blood glucose (NO BRAND SPECIFIED) lancets standard Use to test blood sugar 2 times daily or as directed. 100 each 3    blood glucose (NO BRAND SPECIFIED) test strip Use to test blood sugar 2 times daily or as directed. 100 strip 3    blood glucose (ONETOUCH VERIO IQ) test strip Use to test blood sugar up to 1 time daily. 50 strip 11    Continuous Blood Gluc Sensor (DEXCOM G7 SENSOR) MISC 1 each every 10 days 9 each 3    dapagliflozin (FARXIGA) 10 MG TABS tablet Take 1 tablet (10 mg) by mouth daily 90 tablet 3    Dulaglutide (TRULICITY) 3 MG/0.5ML SOPN Inject 3 mg Subcutaneous once a week 6 mL 3    Dulaglutide (TRULICITY) 4.5 MG/0.5ML SOPN Inject 4.5 mg Subcutaneous once a week 6 mL 3    escitalopram (LEXAPRO) 10 MG tablet       fenofibrate (TRICOR) 145 MG tablet Take 1 tablet (145 mg) by mouth daily 90 tablet 3    fish oil-omega-3 fatty acids 1000 MG capsule       insulin glargine (LANTUS PEN) 100 UNIT/ML pen Inject up to 35 units nightly as directed. 30 mL 3    insulin pen needle (31G X 5 MM) 31G X 5 MM miscellaneous Use 90 pen needles daily or as directed. 90 each 3    lisinopril (ZESTRIL) 5 MG tablet Take 1 tablet (5 mg) by mouth daily 90 tablet 3    metFORMIN (GLUCOPHAGE XR) 500 MG 24 hr tablet Take 2 tablets (1,000 mg) by mouth daily (with dinner) 180 tablet 3       Histories reviewed and updated in Epic.  Past Medical History:    Diagnosis Date    Attention deficit disorder without mention of hyperactivity        No past surgical history on file.    Allergies:  Seasonale    Social History     Tobacco Use    Smoking status: Some Days     Types: Cigarettes, Cigars    Smokeless tobacco: Never   Substance Use Topics    Alcohol use: Yes       No family history on file.       REVIEW OF SYSTEMS:   ROS: 10 point ROS neg other than the symptoms noted above in the HPI.      EXAM:  Vitals: There were no vitals taken for this visit.    BMIE= There is no height or weight on file to calculate BMI.    Physical Exam (visual exam)  VS:  no vital signs taken for video visit  CONSTITUTIONAL: healthy, alert and NAD, responding appropriately  ENT: normocephalic, no visual evidence of trauma, normal nose and oral mucosa  EYES: conjunctivae and sclerae normal, no exophthalmos or proptosis  THYROID:  no visualized nodules or goiter  LUNGS: no audible wheeze, cough or visible cyanosis, no visible retractions or increased work of breathing  EXTREMITIES: no hand tremors  NEUROLOGY: cranial nerves grossly intact with no obvious deficit.  SKIN:  no visualized skin lesions or rash, no edema visualized  PSYCH: mentation appears normal, normal judgement      ASSESSMENT/PLAN:  No orders of the defined types were placed in this encounter.      1) Diabetes Mellitus -- Improving, A1C 7.9% >5.8%!!  - Glucose Control-    - continue metformin 1000mg ER nightly (patient preference, reduce pill burden)   - Continue Trulicity 4.5mg weekly   - Continue farxiga 10mg daily   - Continue dexcom g7  - Referral to nephrology for management of sig proteinuia. Will retest, seeing some improvement and regression of retinopathy so if persistent can consider other etiologies  - Continue ACE/SGLT2 for now for renal protection, need updated ZACHARY  - He will send labs over from [facility] prior to our next visit    2) Hypertriglyceridemia   - On Fenofibrate, Fish Oil   - MUCH improved   -  Consider statin at next visit    RTC- 6 months    A total of 12 minutes were spent today 12/05/23 on this visit including chart review, history and counseling, documentation and other activities as detailed above.

## 2023-12-05 NOTE — LETTER
"    12/5/2023         RE: Ed Murcia  [address] [place] [address]        Dear Colleague,    Thank you for referring your patient, Ed Murcia, to the [facility] [place]. Please see a copy of my visit note below.      Video-Visit Details    Type of service:  Video Visit  Video Start Time: 10:32  Video End Time: 10:42  Originating Location (pt. Location): Home, [place]  Distant Location (provider location):  Home  Platform used for Video Visit: Ban Forte MD    Ed Murcia is a 38 year old year old male here for follow-up of diabetes mellitus, hypertriglyceridemia via a billable video visit.    Has PCP at [facility] (not available in [facility]). He also has PMHx of ADHD and hypertriglyceridemia    INTERVAL HISTORY:  - recovering from bronchitis  - Overall BG significantly higher last two weeks, attributes to Thanksgiving, not worried about the numbers rising and wants to leave everything as is  - Last A1C 5.8% at PCP in October 1) Diabetes Mellitus-- Type 2    Diabetes History:  Diagnosis: 2013, noticed polyuria/polydypsia, had screen done and diagnosed with DM2, some weight loss minimal at time of diagnosis 12/2022- MURPHY <5.0, Cpeptide 1.3, Glucose 104  Hospitalizations: none  Previous Regimens: none  Current Regimen: Metformin 2000mg XR at night, Farxiga 10mg daily, Trulicity 4.5mg, Lantus 35u nightly      BG check- Dexcom            Denies polyuria, polydypsia, overall feels very well    Complications: no retinopathy, recent screening August, does have nephropathy  Fam: aunt Paternal w/ DM    Last eye exam: Completed Dec 2023, no retinopathy \"eyes look better than before\" everything looks good  Foot Exam: completed at previous visit  ACEI/ARB: not now, ZACHARY elevated x2, on SGLT-2  Statin/ASA: not currently taking, on fish oil      2) Hypertriglyceridemia  - Longstanding, currently on Fenofibrate between 8/2-->9/27- with improving levels (1049-->611)  - " Taking fish oil once daily  Much improved! 180 (most recently 12/2022)  Was unable to send updated labs from [facility], will get new ones prior to next visit    BP Readings from Last 3 Encounters:   05/11/22 (!) 138/95       1/20/2022-  A1C- 9.5%  CBC- wnl  CMP- wnl (glucose 227)  Lipids- , HDL 38, , LDL (too high)  UA- No urine protein      No results found for: A1C      No results for input(s): CHOL, HDL, LDL, TRIG, CHOLHDLRATIO in the last 93515 hours.    No results found for: MICROL  No results found for: MICROALBUMIN      Wt Readings from Last 3 Encounters:   05/11/22 79.9 kg (176 lb 3.2 oz)       Current Outpatient Medications   Medication Sig Dispense Refill     Alcohol Swabs PADS Use on skin prior to fingertip glucose testing 3x per day 100 each 11     BD [employer] MISC 1 each daily 1 each 0     blood glucose (NO BRAND SPECIFIED) lancets standard Use to test blood sugar 2 times daily or as directed. 100 each 3     blood glucose (NO BRAND SPECIFIED) test strip Use to test blood sugar 2 times daily or as directed. 100 strip 3     blood glucose (ONETOUCH VERIO IQ) test strip Use to test blood sugar up to 1 time daily. 50 strip 11     Continuous Blood Gluc Sensor (DEXCOM G7 SENSOR) MISC 1 each every 10 days 9 each 3     dapagliflozin (FARXIGA) 10 MG TABS tablet Take 1 tablet (10 mg) by mouth daily 90 tablet 3     Dulaglutide (TRULICITY) 3 MG/0.5ML SOPN Inject 3 mg Subcutaneous once a week 6 mL 3     Dulaglutide (TRULICITY) 4.5 MG/0.5ML SOPN Inject 4.5 mg Subcutaneous once a week 6 mL 3     escitalopram (LEXAPRO) 10 MG tablet        fenofibrate (TRICOR) 145 MG tablet Take 1 tablet (145 mg) by mouth daily 90 tablet 3     fish oil-omega-3 fatty acids 1000 MG capsule        insulin glargine (LANTUS PEN) 100 UNIT/ML pen Inject up to 35 units nightly as directed. 30 mL 3     insulin pen needle (31G X 5 MM) 31G X 5 MM miscellaneous Use 90 pen needles daily or as directed. 90 each 3      lisinopril (ZESTRIL) 5 MG tablet Take 1 tablet (5 mg) by mouth daily 90 tablet 3     metFORMIN (GLUCOPHAGE XR) 500 MG 24 hr tablet Take 2 tablets (1,000 mg) by mouth daily (with dinner) 180 tablet 3       Histories reviewed and updated in Epic.  Past Medical History:   Diagnosis Date     Attention deficit disorder without mention of hyperactivity        No past surgical history on file.    Allergies:  Seasonale    Social History     Tobacco Use     Smoking status: Some Days     Types: Cigarettes, Cigars     Smokeless tobacco: Never   Substance Use Topics     Alcohol use: Yes       No family history on file.       REVIEW OF SYSTEMS:   ROS: 10 point ROS neg other than the symptoms noted above in the HPI.      EXAM:  Vitals: There were no vitals taken for this visit.    BMIE= There is no height or weight on file to calculate BMI.    Physical Exam (visual exam)  VS:  no vital signs taken for video visit  CONSTITUTIONAL: healthy, alert and NAD, responding appropriately  ENT: normocephalic, no visual evidence of trauma, normal nose and oral mucosa  EYES: conjunctivae and sclerae normal, no exophthalmos or proptosis  THYROID:  no visualized nodules or goiter  LUNGS: no audible wheeze, cough or visible cyanosis, no visible retractions or increased work of breathing  EXTREMITIES: no hand tremors  NEUROLOGY: cranial nerves grossly intact with no obvious deficit.  SKIN:  no visualized skin lesions or rash, no edema visualized  PSYCH: mentation appears normal, normal judgement      ASSESSMENT/PLAN:  No orders of the defined types were placed in this encounter.      1) Diabetes Mellitus -- Improving, A1C 7.9% >5.8%!!  - Glucose Control-    - continue metformin 1000mg ER nightly (patient preference, reduce pill burden)   - Continue Trulicity 4.5mg weekly   - Continue farxiga 10mg daily   - Continue dexcom g7  - Referral to nephrology for management of sig proteinuia. Will retest, seeing some improvement and regression of  retinopathy so if persistent can consider other etiologies  - Continue ACE/SGLT2 for now for renal protection, need updated ZACHARY  - He will send labs over from [facility] prior to our next visit    2) Hypertriglyceridemia   - On Fenofibrate, Fish Oil   - MUCH improved   - Consider statin at next visit    RTC- 6 months    A total of 12 minutes were spent today 12/05/23 on this visit including chart review, history and counseling, documentation and other activities as detailed above.       Again, thank you for allowing me to participate in the care of your patient.        Sincerely,        Anjali Forte MD

## 2024-02-03 ENCOUNTER — HEALTH MAINTENANCE LETTER (OUTPATIENT)
Age: 39
End: 2024-02-03

## 2024-02-12 ENCOUNTER — TELEPHONE (OUTPATIENT)
Dept: ENDOCRINOLOGY | Facility: CLINIC | Age: 39
End: 2024-02-12
Payer: COMMERCIAL

## 2024-02-12 NOTE — TELEPHONE ENCOUNTER
[facility] Call Center    Phone Message    May a detailed message be left on voicemail: yes     Reason for Call: Other: Patient is requesting an A1c lab be ordered. Thank you     Action Taken: Message routed to:  [facility] ([facility]):      Travel Screening: Not Applicable

## 2024-02-13 ENCOUNTER — MYC MEDICAL ADVICE (OUTPATIENT)
Dept: ENDOCRINOLOGY | Facility: CLINIC | Age: 39
End: 2024-02-13
Payer: COMMERCIAL

## 2024-02-13 DIAGNOSIS — N18.2 CONTROLLED TYPE 2 DIABETES MELLITUS WITH STAGE 2 CHRONIC KIDNEY DISEASE, WITH LONG-TERM CURRENT USE OF INSULIN (H): Primary | ICD-10-CM

## 2024-02-13 DIAGNOSIS — Z79.4 CONTROLLED TYPE 2 DIABETES MELLITUS WITH STAGE 2 CHRONIC KIDNEY DISEASE, WITH LONG-TERM CURRENT USE OF INSULIN (H): Primary | ICD-10-CM

## 2024-02-13 DIAGNOSIS — E11.22 CONTROLLED TYPE 2 DIABETES MELLITUS WITH STAGE 2 CHRONIC KIDNEY DISEASE, WITH LONG-TERM CURRENT USE OF INSULIN (H): Primary | ICD-10-CM

## 2024-02-13 NOTE — TELEPHONE ENCOUNTER
RN reviewed chart.  Pt last ov was 12/23, and no labs were ordered.  Pt has PCP outside of .  Last labs in chart show 12/22.  [facility] msg sent to pt to see if he has had a more recent A1C, and if he gets his labs done thru his PCP. Deyanira Delacruz RN

## 2024-04-30 ENCOUNTER — TELEPHONE (OUTPATIENT)
Dept: ENDOCRINOLOGY | Facility: CLINIC | Age: 39
End: 2024-04-30
Payer: COMMERCIAL

## 2024-04-30 DIAGNOSIS — E78.1 HYPERTRIGLYCERIDEMIA: ICD-10-CM

## 2024-04-30 NOTE — TELEPHONE ENCOUNTER
[facility] Call Center    Phone Message    May a detailed message be left on voicemail: yes     Reason for Call: Medication Refill Request    Has the patient contacted the pharmacy for the refill? Yes   Name of medication being requested: fenofibrate (TRICOR) 145 MG tablet   Provider who prescribed the medication: Dr. Forte  Pharmacy:   [facility] [address] [place] [address]     Date medication is needed: ASA  Patient states pharmacy sent over a request yesterday, but writer didn't see it. Patient states he's out of the medication. Please review and advise.

## 2024-05-01 RX ORDER — FENOFIBRATE 145 MG/1
145 TABLET, COATED ORAL DAILY
Qty: 90 TABLET | Refills: 0 | Status: SHIPPED | OUTPATIENT
Start: 2024-05-01 | End: 2024-07-29

## 2024-05-01 NOTE — TELEPHONE ENCOUNTER
Requested Prescriptions   Pending Prescriptions Disp Refills    fenofibrate (TRICOR) 145 MG tablet 90 tablet 3     Sig: Take 1 tablet (145 mg) by mouth daily       There is no refill protocol information for this order        Refills sent  Deyanira Delacruz RN

## 2024-05-02 ENCOUNTER — MYC MEDICAL ADVICE (OUTPATIENT)
Dept: ENDOCRINOLOGY | Facility: CLINIC | Age: 39
End: 2024-05-02
Payer: COMMERCIAL

## 2024-06-22 ENCOUNTER — HEALTH MAINTENANCE LETTER (OUTPATIENT)
Age: 39
End: 2024-06-22

## 2024-07-16 DIAGNOSIS — Z79.4 CONTROLLED TYPE 2 DIABETES MELLITUS WITH STAGE 2 CHRONIC KIDNEY DISEASE, WITH LONG-TERM CURRENT USE OF INSULIN (H): ICD-10-CM

## 2024-07-16 DIAGNOSIS — N18.2 CONTROLLED TYPE 2 DIABETES MELLITUS WITH STAGE 2 CHRONIC KIDNEY DISEASE, WITH LONG-TERM CURRENT USE OF INSULIN (H): ICD-10-CM

## 2024-07-16 DIAGNOSIS — E11.22 CONTROLLED TYPE 2 DIABETES MELLITUS WITH STAGE 2 CHRONIC KIDNEY DISEASE, WITH LONG-TERM CURRENT USE OF INSULIN (H): ICD-10-CM

## 2024-07-16 NOTE — TELEPHONE ENCOUNTER
Requested Prescriptions   Pending Prescriptions Disp Refills    dapagliflozin (FARXIGA) 10 MG TABS tablet 90 tablet 3     Sig: Take 1 tablet (10 mg) by mouth daily       There is no refill protocol information for this order            Last Written Prescription Date:  5/1/2024  Last Fill Quantity: 90 tablet,  # refills: 0   Last office visit: Visit date not found ; last virtual visit: 12/5/2023 with prescribing provider: Anjali Forte MD   Future Office Visit: None

## 2024-07-17 RX ORDER — DAPAGLIFLOZIN 10 MG/1
10 TABLET, FILM COATED ORAL DAILY
Qty: 90 TABLET | Refills: 0 | Status: SHIPPED | OUTPATIENT
Start: 2024-07-17 | End: 2024-07-29

## 2024-07-17 NOTE — TELEPHONE ENCOUNTER
Requested Prescriptions   Pending Prescriptions Disp Refills    dapagliflozin (FARXIGA) 10 MG TABS tablet 90 tablet 3     Sig: Take 1 tablet (10 mg) by mouth daily       Sodium Glucose Co-Transport Inhibitor Agents Failed - 7/16/2024 11:02 AM        Failed - Patient has documented A1c within the specified period of time.     If HgbA1C is 8 or greater, it needs to be on file within the past 3 months.  If less than 8, must be on file within the past 6 months.     Recent Labs   Lab Test 12/02/22  0930 08/02/22  1018 08/19/21  1200   A1C 7.9*   < >  --    [address]  --   --  11.0*    < > = values in this interval not displayed.             Failed - Has GFR on file in past 12 months and most recent value is normal        Failed - Recent (6 mo) or future (90 days) visit within the authorizing provider's specialty     The patient must have completed an in-person or virtual visit within the past 6 months or has a future visit scheduled within the next 90 days with the authorizing provider s specialty.  Urgent care and e-visits do not quality as an office visit for this protocol.          Failed - Patient has documented normal Potassium within the last 12 mos.     Recent Labs   Lab Test 12/02/22  0930 08/02/22  1018 08/19/21  1200   POTASSIUM 4.5   < >  --    37597  --   --  4.1    < > = values in this interval not displayed.             Passed - Medication is active on med list        Passed - Medication indicated for associated diagnosis     Medication is associated with one or more of the following diagnoses:     Diabetic nephropathy, With Albuminuria - Type 2 diabetes mellitus     Disorder of cardiovascular system; Prophylaxis - Type 2 diabetes mellitus     Type 2 diabetes mellitus    Disorder of cardiovascular system; Prophylaxis - Heart failure   Chronic kidney disease, (At risk of progression) to reduce the risk of sustained   estimated GFR decline, end-stage kidney disease, cardiovascular death,   and hospitalization for  heart failure     Heart failure, (NYHA class II to IV, reduced ejection fraction) to reduce risk of  cardiovascular death and hospitalization           Passed - Patient is age 18 or older         '  New pharmacy requesting rx- sent remaining refill. Deyanira Delacruz RN

## 2024-07-19 ENCOUNTER — TELEPHONE (OUTPATIENT)
Dept: ENDOCRINOLOGY | Facility: CLINIC | Age: 39
End: 2024-07-19
Payer: COMMERCIAL

## 2024-07-19 DIAGNOSIS — E11.22 CONTROLLED TYPE 2 DIABETES MELLITUS WITH STAGE 2 CHRONIC KIDNEY DISEASE, WITH LONG-TERM CURRENT USE OF INSULIN (H): Primary | ICD-10-CM

## 2024-07-19 DIAGNOSIS — N18.2 CONTROLLED TYPE 2 DIABETES MELLITUS WITH STAGE 2 CHRONIC KIDNEY DISEASE, WITH LONG-TERM CURRENT USE OF INSULIN (H): Primary | ICD-10-CM

## 2024-07-19 DIAGNOSIS — Z79.4 CONTROLLED TYPE 2 DIABETES MELLITUS WITH STAGE 2 CHRONIC KIDNEY DISEASE, WITH LONG-TERM CURRENT USE OF INSULIN (H): Primary | ICD-10-CM

## 2024-07-19 NOTE — TELEPHONE ENCOUNTER
Prior Authorization Retail Medication Request    Medication/Dose: dapagliflozin (FARXIGA) 10 MG TABS tablet    Diagnosis and ICD code (if different than what is on RX):  E11.22, N18.2, Z79.4     New/renewal/insurance change PA/secondary ins. PA: New     Insurance   Primary: [facility] - [employer]   Insurance ID:  [id number]      Pharmacy Information (if different than what is on RX)  Name:  [facility]  Phone:  [phone]   Fax:[phone]

## 2024-07-23 NOTE — TELEPHONE ENCOUNTER
PA Initiation    Medication: DAPAGLIFLOZIN PROPANEDIOL 10 MG PO TABS  Insurance Company: [employer] - Phone [phone] Fax [phone]  Pharmacy Filling the Rx: [id number] [address] AT [facility] [address]  Filling Pharmacy Phone: [phone]  Filling Pharmacy Fax: [phone]  Start Date: 7/23/2024

## 2024-07-25 NOTE — TELEPHONE ENCOUNTER
PRIOR AUTHORIZATION DENIED    Medication: DAPAGLIFLOZIN PROPANEDIOL 10 MG PO TABS  Insurance Company: [employer] - Phone [phone] Fax [phone]  Denial Date: 7/24/2024  Denial Reason(s):       Appeal Information:       Patient Notified: NO

## 2024-07-29 ENCOUNTER — TELEPHONE (OUTPATIENT)
Dept: ENDOCRINOLOGY | Facility: CLINIC | Age: 39
End: 2024-07-29
Payer: COMMERCIAL

## 2024-07-29 DIAGNOSIS — E78.1 HYPERTRIGLYCERIDEMIA: ICD-10-CM

## 2024-07-29 RX ORDER — FENOFIBRATE 145 MG/1
145 TABLET, COATED ORAL DAILY
Qty: 30 TABLET | Refills: 0 | Status: SHIPPED | OUTPATIENT
Start: 2024-07-29

## 2024-07-29 NOTE — TELEPHONE ENCOUNTER
Last Written Prescription Date:  5/1/24  Last Fill Quantity: 90,  # refills: 0   Last office visit: Visit date not found ; last virtual visit: 12/5/2023 with prescribing provider:  dr wilde   Future Office Visit:           Requested Prescriptions   Pending Prescriptions Disp Refills    fenofibrate (TRICOR) 145 MG tablet 90 tablet 0     Sig: Take 1 tablet (145 mg) by mouth daily       There is no refill protocol information for this order

## 2024-07-29 NOTE — TELEPHONE ENCOUNTER
Patient overdue for appt as of 6/2024. Has cancelled video appts on 5/28/24 and 7/2/24. Sent courtesy 30 day refill and sent message to scheduling to offer return appointment for patient.     Ranjit Womack RN

## 2024-07-29 NOTE — TELEPHONE ENCOUNTER
LVM for PT to call [phone] to schedule f/u appt with Dr Forte 1st avail. I did stacey 8/14 at 2:30. ok to schedule if that spot is not filled.

## 2024-11-09 ENCOUNTER — HEALTH MAINTENANCE LETTER (OUTPATIENT)
Age: 39
End: 2024-11-09

## 2025-03-02 ENCOUNTER — HEALTH MAINTENANCE LETTER (OUTPATIENT)
Age: 40
End: 2025-03-02

## 2025-06-21 ENCOUNTER — HEALTH MAINTENANCE LETTER (OUTPATIENT)
Age: 40
End: 2025-06-21

## 2025-07-12 ENCOUNTER — HEALTH MAINTENANCE LETTER (OUTPATIENT)
Age: 40
End: 2025-07-12